# Patient Record
Sex: FEMALE | Race: WHITE | NOT HISPANIC OR LATINO | Employment: OTHER | URBAN - METROPOLITAN AREA
[De-identification: names, ages, dates, MRNs, and addresses within clinical notes are randomized per-mention and may not be internally consistent; named-entity substitution may affect disease eponyms.]

---

## 2023-06-19 RX ORDER — ATORVASTATIN CALCIUM 10 MG/1
10 TABLET, FILM COATED ORAL
COMMUNITY

## 2023-06-19 RX ORDER — FERROUS SULFATE 325(65) MG
325 TABLET ORAL DAILY
COMMUNITY

## 2023-06-21 ENCOUNTER — OFFICE VISIT (OUTPATIENT)
Dept: INTERNAL MEDICINE CLINIC | Facility: CLINIC | Age: 78
End: 2023-06-21
Payer: MEDICARE

## 2023-06-21 VITALS
BODY MASS INDEX: 28.32 KG/M2 | HEIGHT: 61 IN | OXYGEN SATURATION: 99 % | WEIGHT: 150 LBS | DIASTOLIC BLOOD PRESSURE: 68 MMHG | HEART RATE: 92 BPM | SYSTOLIC BLOOD PRESSURE: 118 MMHG

## 2023-06-21 DIAGNOSIS — I48.0 PAF (PAROXYSMAL ATRIAL FIBRILLATION) (HCC): ICD-10-CM

## 2023-06-21 DIAGNOSIS — D33.3 VESTIBULAR SCHWANNOMA (HCC): ICD-10-CM

## 2023-06-21 DIAGNOSIS — K86.2 PANCREATIC CYST: ICD-10-CM

## 2023-06-21 DIAGNOSIS — I10 ESSENTIAL HYPERTENSION: ICD-10-CM

## 2023-06-21 DIAGNOSIS — R31.0 GROSS HEMATURIA: ICD-10-CM

## 2023-06-21 DIAGNOSIS — E11.9 TYPE II DIABETES MELLITUS, WELL CONTROLLED (HCC): ICD-10-CM

## 2023-06-21 DIAGNOSIS — N18.31 STAGE 3A CHRONIC KIDNEY DISEASE (CKD) (HCC): ICD-10-CM

## 2023-06-21 DIAGNOSIS — Z76.89 ENCOUNTER TO ESTABLISH CARE: Primary | ICD-10-CM

## 2023-06-21 DIAGNOSIS — I50.32 CHRONIC DIASTOLIC CHF (CONGESTIVE HEART FAILURE) (HCC): ICD-10-CM

## 2023-06-21 DIAGNOSIS — C50.912 INFILTRATING DUCTAL CARCINOMA OF LEFT BREAST (HCC): ICD-10-CM

## 2023-06-21 PROBLEM — K76.0 HEPATIC STEATOSIS: Status: ACTIVE | Noted: 2017-12-21

## 2023-06-21 PROBLEM — G60.9 HEREDITARY AND IDIOPATHIC PERIPHERAL NEUROPATHY: Status: ACTIVE | Noted: 2022-08-08

## 2023-06-21 PROBLEM — Z98.890 S/P CRANIOTOMY: Status: ACTIVE | Noted: 2023-06-21

## 2023-06-21 PROBLEM — R90.89 ABNORMAL BRAIN MRI: Status: ACTIVE | Noted: 2021-05-03

## 2023-06-21 PROBLEM — Z95.0 CARDIAC PACEMAKER IN SITU: Status: ACTIVE | Noted: 2023-06-21

## 2023-06-21 PROBLEM — M85.859 OSTEOPENIA OF HIP: Status: ACTIVE | Noted: 2023-05-03

## 2023-06-21 LAB
LEFT EYE DIABETIC RETINOPATHY: ABNORMAL
LEFT EYE IMAGE QUALITY: ABNORMAL
LEFT EYE MACULAR EDEMA: ABNORMAL
LEFT EYE OTHER RETINOPATHY: ABNORMAL
RIGHT EYE DIABETIC RETINOPATHY: ABNORMAL
RIGHT EYE IMAGE QUALITY: ABNORMAL
RIGHT EYE MACULAR EDEMA: ABNORMAL
RIGHT EYE OTHER RETINOPATHY: ABNORMAL
SEVERITY (EYE EXAM): ABNORMAL
SL AMB POCT HEMOGLOBIN AIC: 6.7 (ref ?–6.5)

## 2023-06-21 PROCEDURE — 83036 HEMOGLOBIN GLYCOSYLATED A1C: CPT | Performed by: INTERNAL MEDICINE

## 2023-06-21 PROCEDURE — 99204 OFFICE O/P NEW MOD 45 MIN: CPT | Performed by: INTERNAL MEDICINE

## 2023-06-21 PROCEDURE — 92250 FUNDUS PHOTOGRAPHY W/I&R: CPT | Performed by: INTERNAL MEDICINE

## 2023-06-21 PROCEDURE — 99214 OFFICE O/P EST MOD 30 MIN: CPT | Performed by: INTERNAL MEDICINE

## 2023-06-21 RX ORDER — ATORVASTATIN CALCIUM 10 MG/1
10 TABLET, FILM COATED ORAL DAILY
COMMUNITY
Start: 2023-03-16

## 2023-06-21 RX ORDER — ALBUTEROL SULFATE 90 UG/1
POWDER, METERED RESPIRATORY (INHALATION)
COMMUNITY
Start: 2023-06-07

## 2023-06-21 RX ORDER — LACTOBACILLUS RHAMNOSUS GG 10B CELL
1 CAPSULE ORAL
COMMUNITY

## 2023-06-21 RX ORDER — ANASTROZOLE 1 MG/1
1 TABLET ORAL DAILY
COMMUNITY
Start: 2014-01-14

## 2023-06-21 RX ORDER — FLECAINIDE ACETATE 100 MG/1
100 TABLET ORAL 2 TIMES DAILY
COMMUNITY
Start: 2023-06-16 | End: 2024-06-15

## 2023-06-21 RX ORDER — VALSARTAN 160 MG/1
160 TABLET ORAL DAILY
COMMUNITY
Start: 2022-12-01 | End: 2023-12-01

## 2023-06-21 RX ORDER — FUROSEMIDE 20 MG/1
20 TABLET ORAL DAILY PRN
COMMUNITY
Start: 2022-11-30 | End: 2023-11-30

## 2023-06-21 RX ORDER — DILTIAZEM HYDROCHLORIDE 300 MG/1
300 CAPSULE, COATED, EXTENDED RELEASE ORAL DAILY
COMMUNITY
Start: 2023-04-21

## 2023-06-21 RX ORDER — METOPROLOL SUCCINATE 200 MG/1
200 TABLET, EXTENDED RELEASE ORAL DAILY
COMMUNITY
Start: 2022-12-01 | End: 2023-12-01

## 2023-06-21 NOTE — ASSESSMENT & PLAN NOTE
Wt Readings from Last 3 Encounters:   06/21/23 68 kg (150 lb)   05/14/14 65 3 kg (144 lb)     -Appears well compensated on Exam  -[address] [facility] f/u

## 2023-06-21 NOTE — PROGRESS NOTES
Name: Ned Brock      : [birth year]      MRN: [id number]  Encounter Provider: Tere Jimenez MD  Encounter Date: 2023   [facility] [address] [place] [address],[address]     1  Encounter to establish care    2  Chronic diastolic CHF (congestive heart failure) ([facility])  Assessment & Plan:  Wt Readings from Last 3 Encounters:   23 68 kg (150 lb)   14 65 3 kg (144 lb)     -Appears well compensated on Exam  -[address] Cardiology f/u      3  PAF (paroxysmal atrial fibrillation) ([facility])  Assessment & Plan:  -Rate controlled   -Currently followed by Cardiology   -Continue Eliquis       4  Type II diabetes mellitus, well controlled ([address] )  -     POCT hemoglobin A1c  -     IRIS Diabetic eye exam    5  Vestibular schwannoma [facility])  Assessment & Plan:  -Follow-up with Dr Eda Huston at [facility]      6  Infiltrating ductal carcinoma of left breast [facility])  Assessment & Plan:  -Followed by Dr Ally Hugo   -States she has a f/u PET scan ordered   -Continue Arimidex      7  Stage 3a chronic kidney disease (CKD) ([facility])  Assessment & Plan:  -GFR stable   -Avoid nephrotoxic agents      8  Gross hematuria  Assessment & Plan:  -DX on 6/10   -Patient reports her hematuria has cleared up since being treated for her E  coli UTI with Bactrim  -Recommended she keep scheduled follow-up with urology for evaluation      9  Essential hypertension  Assessment & Plan:  -Blood pressure well controlled   -Continue current antihypertensive regimen       10  Pancreatic cyst  Assessment & Plan:  -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  -Followed by [facility]         BMI Counseling: Body mass index is 28 34 kg/m²  The BMI is above normal  Nutrition recommendations include 3-5 servings of fruits/vegetables daily and increasing intake of lean protein  Exercise recommendations include exercising 3-5 times per week  Subjective     HPI  Patient presents today to establish care    She is a former patient of mine from a previous practice  She has a history of an acoustic neuroma and is status post gamma knife therapy and craniotomy at [facility]  Her procedure was performed by Dr Reyes Lie  She has been followed over the past several years by way of brain MRI  She also has a history of paroxysmal atrial fibrillation, diastolic heart failure and tachybradycardia syndrome  She is status post pacemaker placement  She currently denies any shortness of breath or significant weight gain  She is followed by Dr Jerome Beach of cardiology at [facility]  She was also diagnosed with infiltrating ductal carcinoma of the left breast   She is status post bilateral mastectomy  She is followed by Dr Sadie Maier of oncology at [facility]  She is currently on Arimidex therapy  She reports she was noted to have some abnormalities on her recent bone scan and has a follow-up PET scan ordered  ROS as per HPI    Past Medical History:   Diagnosis Date   • Acoustic neuroma [facility])    • Atrial fibrillation ([facility] )    • Breast cancer ([facility] )    • Chronic kidney disease    • Diabetes mellitus ([facility] )    • Diastolic heart failure ([facility] )    • Hypertension    • Hypothyroidism      Past Surgical History:   Procedure Laterality Date   • APPENDECTOMY         • CATARACT EXTRACTION     •  SECTION         • CRANIOTOMY      At [facility] for removal of acoustic neuroma   • HYSTERECTOMY     • MASTECTOMY Bilateral        • OOPHORECTOMY           History reviewed  No pertinent family history    Social History     Socioeconomic History   • Marital status: /Civil Union     Spouse name: None   • Number of children: None   • Years of education: None   • Highest education level: None   Occupational History   • None   Tobacco Use   • Smoking status: None   • Smokeless tobacco: None   Substance and Sexual Activity   • Alcohol use: None   • Drug use: None   • Sexual activity: None   Other Topics Concern   • None   Social History Narrative   • None     Social Determinants of Health     Financial Resource Strain: Not on file   Food Insecurity: Not on file   Transportation Needs: Not on file   Physical Activity: Not on file   Stress: Not on file   Social Connections: Not on file   Intimate Partner Violence: Not on file   Housing Stability: Not on file     Current Outpatient Medications on File Prior to Visit   Medication Sig   • anastrozole (ARIMIDEX) 1 mg tablet Take 1 mg by mouth daily   • apixaban (Eliquis) 5 mg Take 5 mg by mouth 2 (two) times a day   • atorvastatin (LIPITOR) 10 mg tablet Take 10 mg by mouth daily at bedtime   • atorvastatin (LIPITOR) 10 mg tablet Take 10 mg by mouth daily   • diltiazem (CARDIZEM CD) 300 mg 24 hr capsule Take 300 mg by mouth in the morning   • ferrous sulfate 325 (65 Fe) mg tablet Take 325 mg by mouth in the morning   • flecainide (TAMBOCOR) 100 mg tablet Take 100 mg by mouth 2 (two) times a day   • furosemide (LASIX) 20 mg tablet Take 20 mg by mouth daily as needed   • Lactobacillus Rhamnosus, GG, (Culturelle) CAPS Take 1 capsule by mouth   • metFORMIN (GLUCOPHAGE) 500 mg tablet Take 1,000 mg by mouth   • metoprolol succinate (TOPROL-XL) 200 MG 24 hr tablet Take 200 mg by mouth daily   • ProAir RespiClick 251 (90 Base) MCG/ACT AEPB INHALE 1 PUFF BY MOUTH EVERY 4 HOURS AS NEEDED FOR DIFFICULT BREATHING   • valsartan (DIOVAN) 160 mg tablet Take 160 mg by mouth daily   • [DISCONTINUED] Multiple Vitamins-Minerals (CULTURELLE PROBIOTICS + MULTIV PO)  (Patient not taking: Reported on 6/21/2023)     Allergies   Allergen Reactions   • Codeine Other (See Comments)   • Levofloxacin Hives     lip swelling     • Metronidazole Swelling     Flagyl      • Cephalexin Rash     Keflex      • Penicillins Rash     Immunization History   Administered Date(s) Administered   • COVID-19 PFIZER VACCINE 0 3 ML IM 02/05/2021, 02/26/2021, 08/25/2021   • COVID-19 Pfizer vac (Héctor-sucrose, gray cap) 12 yr+ IM 06/03/2022   • INFLUENZA 10/24/2011, 10/22/2012, 12/09/2013, "09/19/2014, 10/06/2015, 10/18/2016, 10/27/2017, 10/07/2018, 10/22/2019, 10/15/2021, 11/05/2022   • Influenza, Seasonal Vaccine, Quadrivalent, Adjuvanted,  5e 09/24/2020, 10/15/2021, 11/05/2022   • Pneumococcal Conjugate 13-Valent 01/13/2016   • Pneumococcal Polysaccharide PPV23 10/02/2009   • Td (adult), Unspecified 10/02/2008   • Tdap 08/30/2018   • Zoster 12/26/2008   • Zoster Vaccine Recombinant 08/30/2018, 02/03/2019   • influenza, trivalent, adjuvanted 10/07/2018, 09/24/2020       Objective     /68   Pulse 92   Ht 5' 1\" (1 549 m)   Wt 68 kg (150 lb)   SpO2 99%   BMI 28 34 kg/m²     BP Readings from Last 3 Encounters:   06/21/23 118/68   05/14/14 128/70        Wt Readings from Last 3 Encounters:   06/21/23 68 kg (150 lb)   05/14/14 65 3 kg (144 lb)        Physical Exam    General: NAD, Alert and oriented x3   HEENT: NCAT, EOMI, normal conjunctiva  Cardiovascular: RRR, normal S1 and S2, no m/r/g  Pulmonary: Normal respiratory effort, no wheezes, rales or rhonchi  GI: Soft, nontender, nondistended, normoactive bowel sounds  MSK: Normal bulk and tone  Neuro: Non-focal, ambulating without difficulty, non-antalgic gait  Extremities: No lower extremity edema  Skin: Normal skin color, no rashes     Chandler Fisher MD  "

## 2023-06-21 NOTE — PATIENT INSTRUCTIONS
-Contact your neurosurgeon to see when you are due for your next MRI   -Keep scheduled follow-up with urology

## 2023-06-21 NOTE — ASSESSMENT & PLAN NOTE
-DX on 6/10   -Patient reports her hematuria has cleared up since being treated for her E  coli UTI with Bactrim  -Recommended she keep scheduled follow-up with urology for evaluation

## 2023-07-07 LAB
LEFT EYE DIABETIC RETINOPATHY: NORMAL
RIGHT EYE DIABETIC RETINOPATHY: NORMAL

## 2023-10-05 DIAGNOSIS — E11.9 TYPE II DIABETES MELLITUS, WELL CONTROLLED (HCC): Primary | ICD-10-CM

## 2023-10-11 ENCOUNTER — TELEPHONE (OUTPATIENT)
Age: 78
End: 2023-10-11

## 2023-10-11 NOTE — TELEPHONE ENCOUNTER
Pts daughter called to say she tested positive today for Covid. Pt accidentally drank her daughter water today and they are concerned. Pt saw the urologist this morning and the pt has been on bactrim for the past few days. Pt does not currently have any symptoms. Please advise is there is anything they can do.

## 2023-10-12 ENCOUNTER — TELEPHONE (OUTPATIENT)
Age: 78
End: 2023-10-12

## 2023-10-12 DIAGNOSIS — E11.9 TYPE II DIABETES MELLITUS, WELL CONTROLLED (HCC): Primary | ICD-10-CM

## 2023-10-12 NOTE — TELEPHONE ENCOUNTER
She should remain on the Metformin ER. To reduce her pill burden I can give her the 1000 mg tablets to take twice a day instead of 2 tablets twice a day. Please see if she is okay with this.

## 2023-10-12 NOTE — TELEPHONE ENCOUNTER
Recommend patient continue to monitor for any upper respiratory symptoms. If she develops any she should perform a COVID test. Given her age and co-morbid conditions she would be a candidate for COVID antiviral therapy if needed.

## 2023-10-12 NOTE — TELEPHONE ENCOUNTER
Please check which pharmacy she wants this sent to. We don't have anything listed in her chart. Also, I'm sending the 500mg tablets. It looks like the 1000mg ER tablets are not covered by her insurance.

## 2023-10-12 NOTE — TELEPHONE ENCOUNTER
Pt. Is confused on which medication she should be taking. Dr. Yesenia Hernandez has given her Metformin and Metformin ER. The pharmacy was questioning it and she doesn't know if he wants her to stop the Extended Release or not. Pt. Is requesting a call back.   Ty

## 2023-10-12 NOTE — TELEPHONE ENCOUNTER
Patient was reassured to monitor for upper respiratory symptom, to have a covid test if patient develops upper respiratory symptoms, to call back if covid positive to start on Paxlovid. Patient verbalized understanding of the advice.

## 2023-10-13 RX ORDER — METFORMIN HYDROCHLORIDE 500 MG/1
1000 TABLET, EXTENDED RELEASE ORAL 2 TIMES DAILY WITH MEALS
COMMUNITY
End: 2023-10-13 | Stop reason: SDUPTHER

## 2023-10-13 RX ORDER — METFORMIN HYDROCHLORIDE 500 MG/1
1000 TABLET, EXTENDED RELEASE ORAL 2 TIMES DAILY WITH MEALS
Qty: 360 TABLET | Refills: 1 | Status: SHIPPED | OUTPATIENT
Start: 2023-10-13

## 2023-10-24 ENCOUNTER — TELEPHONE (OUTPATIENT)
Dept: INTERNAL MEDICINE CLINIC | Facility: CLINIC | Age: 78
End: 2023-10-24

## 2023-10-24 ENCOUNTER — OFFICE VISIT (OUTPATIENT)
Dept: INTERNAL MEDICINE CLINIC | Facility: CLINIC | Age: 78
End: 2023-10-24
Payer: MEDICARE

## 2023-10-24 VITALS
HEART RATE: 76 BPM | HEIGHT: 61 IN | WEIGHT: 156.4 LBS | DIASTOLIC BLOOD PRESSURE: 74 MMHG | TEMPERATURE: 98.6 F | BODY MASS INDEX: 29.53 KG/M2 | SYSTOLIC BLOOD PRESSURE: 120 MMHG | OXYGEN SATURATION: 98 %

## 2023-10-24 DIAGNOSIS — R50.9 FEVER, UNSPECIFIED FEVER CAUSE: Primary | ICD-10-CM

## 2023-10-24 PROCEDURE — 99214 OFFICE O/P EST MOD 30 MIN: CPT | Performed by: INTERNAL MEDICINE

## 2023-10-24 RX ORDER — AZITHROMYCIN 250 MG/1
TABLET, FILM COATED ORAL
Qty: 6 TABLET | Refills: 0 | Status: SHIPPED | OUTPATIENT
Start: 2023-10-24 | End: 2023-10-29

## 2023-10-24 NOTE — TELEPHONE ENCOUNTER
Patient's daughter is asking if it is ok for the patient to get a flu shot on Friday.           Please advise      CB:Salena(daughter)-[phone]

## 2023-10-24 NOTE — PROGRESS NOTES
Name: Javid Magallanes      :       MRN: [id number]  Encounter Provider: Jaqui Novoa MD  Encounter Date: 10/24/2023   [facility]     1. Fever, unspecified fever cause  Assessment & Plan:  Pt reports that in the past she has ended up in the ED when she has had symptoms like this. Will cover with azithromycin. Discussed getting chest x-ray if not improving. Patient instructed to go to the emergency room if worsening, pulse ox is good at 98%. Orders:  -     azithromycin (Zithromax) 250 mg tablet; Take 2 tablets (500 mg total) by mouth daily for 1 day, THEN 1 tablet (250 mg total) daily for 4 days. Depression Screening and Follow-up Plan: Patient was screened for depression during today's encounter. They screened negative with a PHQ-2 score of 0. Subjective     Onset two days ago of sore throat, runny nose, congestion, fevers, aches, chills, increased shortness of breath. No hemoptysis, no pleuritic pain. Patient did a COVID test today which was negative    Fever  Associated symptoms include arthralgias, chills, congestion, a fever and a sore throat. Pertinent negatives include no coughing. Review of Systems   Constitutional:  Positive for chills and fever. HENT:  Positive for congestion, sneezing and sore throat. Respiratory:  Positive for shortness of breath. Negative for cough. Musculoskeletal:  Positive for arthralgias.        Past Medical History:   Diagnosis Date   • Acoustic neuroma [facility])    • Atrial fibrillation (HCC)    • Breast cancer ([facility])    • Chronic kidney disease    • Diabetes mellitus ([address])    • Diastolic heart failure ([address])    • Hypertension    • Hypothyroidism      Past Surgical History:   Procedure Laterality Date   • APPENDECTOMY         • CATARACT EXTRACTION     •  SECTION         • CRANIOTOMY      At [facility] for removal of acoustic neuroma   • HYSTERECTOMY     • MASTECTOMY Bilateral     2013   • OOPHORECTOMY      1992   • US GUIDED THYROID BIOPSY  10/26/2020     History reviewed. No pertinent family history.   Social History     Socioeconomic History   • Marital status: /Civil Union     Spouse name: None   • Number of children: None   • Years of education: None   • Highest education level: None   Occupational History   • None   Tobacco Use   • Smoking status: None   • Smokeless tobacco: None   Substance and Sexual Activity   • Alcohol use: None   • Drug use: None   • Sexual activity: None   Other Topics Concern   • None   Social History Narrative   • None     Social Determinants of Health     Financial Resource Strain: Not on file   Food Insecurity: Not on file   Transportation Needs: Not on file   Physical Activity: Not on file   Stress: Not on file   Social Connections: Not on file   Intimate Partner Violence: Not on file   Housing Stability: Not on file     Current Outpatient Medications on File Prior to Visit   Medication Sig   • anastrozole (ARIMIDEX) 1 mg tablet Take 1 mg by mouth daily   • apixaban (Eliquis) 5 mg Take 5 mg by mouth 2 (two) times a day   • atorvastatin (LIPITOR) 10 mg tablet Take 10 mg by mouth daily at bedtime   • atorvastatin (LIPITOR) 10 mg tablet Take 10 mg by mouth daily   • diltiazem (CARDIZEM CD) 300 mg 24 hr capsule Take 300 mg by mouth in the morning   • ferrous sulfate 325 (65 Fe) mg tablet Take 325 mg by mouth in the morning   • flecainide (TAMBOCOR) 100 mg tablet Take 100 mg by mouth 2 (two) times a day   • furosemide (LASIX) 20 mg tablet Take 20 mg by mouth daily as needed   • Lactobacillus Rhamnosus, GG, (Culturelle) CAPS Take 1 capsule by mouth   • metFORMIN (GLUCOPHAGE-XR) 500 mg 24 hr tablet Take 2 tablets (1,000 mg total) by mouth 2 (two) times a day with meals   • metoprolol succinate (TOPROL-XL) 200 MG 24 hr tablet Take 200 mg by mouth daily   • ProAir RespiClick 458 (90 Base) MCG/ACT AEPB INHALE 1 PUFF BY MOUTH EVERY 4 HOURS AS NEEDED FOR DIFFICULT BREATHING   • valsartan (DIOVAN) 160 mg tablet Take 160 mg by mouth daily     Allergies   Allergen Reactions   • Codeine Other (See Comments)   • Levofloxacin Hives     lip swelling     • Metronidazole Swelling     Flagyl      • Cephalexin Rash     Keflex      • Penicillins Rash     Immunization History   Administered Date(s) Administered   • COVID-19 PFIZER VACCINE 0.3 ML IM 02/05/2021, 02/26/2021, 08/25/2021   • COVID-19 Pfizer vac (Héctor-sucrose, gray cap) 12 yr+ IM 06/03/2022   • INFLUENZA 10/24/2011, 10/22/2012, 12/09/2013, 09/19/2014, 10/06/2015, 10/18/2016, 10/27/2017, 10/07/2018, 10/22/2019, 10/15/2021, 11/05/2022   • Influenza, Seasonal Vaccine, Quadrivalent, Adjuvanted, .5e 09/24/2020, 10/15/2021, 11/05/2022   • Pneumococcal Conjugate 13-Valent 01/13/2016   • Pneumococcal Polysaccharide PPV23 10/02/2009   • Td (adult), Unspecified 10/02/2008   • Tdap 08/30/2018   • Zoster 12/26/2008   • Zoster Vaccine Recombinant 08/30/2018, 02/03/2019   • influenza, trivalent, adjuvanted 10/07/2018, 09/24/2020       Objective     /74   Pulse 76   Temp 98.6 °F (37 °C)   Ht 5' 1" (1.549 m)   Wt 70.9 kg (156 lb 6.4 oz)   SpO2 98%   BMI 29.55 kg/m²     Physical Exam  Constitutional:       General: She is not in acute distress. Appearance: Normal appearance. Pulmonary:      Effort: Pulmonary effort is normal. No respiratory distress. Breath sounds: Normal breath sounds. No wheezing or rhonchi. Neurological:      Mental Status: She is alert.        You Lara MD

## 2023-10-24 NOTE — ASSESSMENT & PLAN NOTE
Pt reports that in the past she has ended up in the ED when she has had symptoms like this. Will cover with azithromycin. Discussed getting chest x-ray if not improving. Patient instructed to go to the emergency room if worsening, pulse ox is good at 98%.

## 2023-10-24 NOTE — PATIENT INSTRUCTIONS
Problem List Items Addressed This Visit          Other    Fever - Primary     Pt reports that in the past she has ended up in the ED when she has had symptoms like this. Will cover with azithromycin. Discussed getting chest x-ray if not improving. Patient instructed to go to the emergency room if worsening, pulse ox is good at 98%.            Relevant Medications    azithromycin (Zithromax) 250 mg tablet

## 2023-10-31 ENCOUNTER — RA CDI HCC (OUTPATIENT)
Dept: OTHER | Facility: HOSPITAL | Age: 78
End: 2023-10-31

## 2023-11-01 ENCOUNTER — OFFICE VISIT (OUTPATIENT)
Dept: INTERNAL MEDICINE CLINIC | Facility: CLINIC | Age: 78
End: 2023-11-01
Payer: MEDICARE

## 2023-11-01 VITALS
DIASTOLIC BLOOD PRESSURE: 74 MMHG | HEIGHT: 61 IN | SYSTOLIC BLOOD PRESSURE: 116 MMHG | BODY MASS INDEX: 29.15 KG/M2 | OXYGEN SATURATION: 99 % | WEIGHT: 154.4 LBS | HEART RATE: 81 BPM

## 2023-11-01 DIAGNOSIS — Z00.00 MEDICARE ANNUAL WELLNESS VISIT, SUBSEQUENT: Primary | ICD-10-CM

## 2023-11-01 DIAGNOSIS — E11.9 TYPE II DIABETES MELLITUS, WELL CONTROLLED (HCC): ICD-10-CM

## 2023-11-01 DIAGNOSIS — I48.0 PAF (PAROXYSMAL ATRIAL FIBRILLATION) (HCC): ICD-10-CM

## 2023-11-01 DIAGNOSIS — I50.32 CHRONIC DIASTOLIC CHF (CONGESTIVE HEART FAILURE) (HCC): ICD-10-CM

## 2023-11-01 DIAGNOSIS — I10 ESSENTIAL HYPERTENSION: ICD-10-CM

## 2023-11-01 DIAGNOSIS — R31.9 HEMATURIA, UNSPECIFIED TYPE: ICD-10-CM

## 2023-11-01 LAB — SL AMB POCT HEMOGLOBIN AIC: 6.6 (ref ?–6.5)

## 2023-11-01 PROCEDURE — G0439 PPPS, SUBSEQ VISIT: HCPCS | Performed by: INTERNAL MEDICINE

## 2023-11-01 PROCEDURE — 83036 HEMOGLOBIN GLYCOSYLATED A1C: CPT | Performed by: INTERNAL MEDICINE

## 2023-11-01 NOTE — ASSESSMENT & PLAN NOTE
Wt Readings from Last 3 Encounters:   11/01/23 70 kg (154 lb 6.4 oz)   10/24/23 70.9 kg (156 lb 6.4 oz)   06/21/23 68 kg (150 lb)     -Appears euvolemic on exam

## 2023-11-01 NOTE — PATIENT INSTRUCTIONS
Medicare Preventive Visit Patient Instructions  Thank you for completing your Welcome to Medicare Visit or Medicare Annual Wellness Visit today. Your next wellness visit will be due in one year (11/1/2024). The screening/preventive services that you may require over the next 5-10 years are detailed below. Some tests may not apply to you based off risk factors and/or age. Screening tests ordered at today's visit but not completed yet may show as past due. Also, please note that scanned in results may not display below. Preventive Screenings:  Service Recommendations Previous Testing/Comments   Colorectal Cancer Screening  * Colonoscopy    * Fecal Occult Blood Test (FOBT)/Fecal Immunochemical Test (FIT)  * Fecal DNA/Cologuard Test  * Flexible Sigmoidoscopy Age: 43-73 years old   Colonoscopy: every 10 years (may be performed more frequently if at higher risk)  OR  FOBT/FIT: every 1 year  OR  Cologuard: every 3 years  OR  Sigmoidoscopy: every 5 years  Screening may be recommended earlier than age 39 if at higher risk for colorectal cancer. Also, an individualized decision between you and your healthcare provider will decide whether screening between the ages of 77-80 would be appropriate. Colonoscopy: Not on file  FOBT/FIT: Not on file  Cologuard: Not on file  Sigmoidoscopy: Not on file          Breast Cancer Screening Age: 36 years old  Frequency: every 1-2 years  Not required if history of left and right mastectomy Mammogram: Not on file        Cervical Cancer Screening Between the ages of 21-29, pap smear recommended once every 3 years. Between the ages of 32-69, can perform pap smear with HPV co-testing every 5 years.    Recommendations may differ for women with a history of total hysterectomy, cervical cancer, or abnormal pap smears in past. Pap Smear: Not on file        Hepatitis C Screening Once for adults born between [address] [place] [address]  More frequently in patients at high risk for Hepatitis C Hep C Antibody: Not on file        Diabetes Screening 1-2 times per year if you're at risk for diabetes or have pre-diabetes Fasting glucose: No results in last 5 years (No results in last 5 years)  A1C: 6.7 (6/21/2023)      Cholesterol Screening Once every 5 years if you don't have a lipid disorder. May order more often based on risk factors. Lipid panel: 12/12/2022          Other Preventive Screenings Covered by Medicare:  Abdominal Aortic Aneurysm (AAA) Screening: covered once if your at risk. You're considered to be at risk if you have a family history of AAA. Lung Cancer Screening: covers low dose CT scan once per year if you meet all of the following conditions: (1) Age 48-67; (2) No signs or symptoms of lung cancer; (3) Current smoker or have quit smoking within the last 15 years; (4) You have a tobacco smoking history of at least 20 pack years (packs per day multiplied by number of years you smoked); (5) You get a written order from a healthcare provider. Glaucoma Screening: covered annually if you're considered high risk: (1) You have diabetes OR (2) Family history of glaucoma OR (3)  aged 48 and older OR (3)  American aged 72 and older  Osteoporosis Screening: covered every 2 years if you meet one of the following conditions: (1) You're estrogen deficient and at risk for osteoporosis based off medical history and other findings; (2) Have a vertebral abnormality; (3) On glucocorticoid therapy for more than 3 months; (4) Have primary hyperparathyroidism; (5) On osteoporosis medications and need to assess response to drug therapy. Last bone density test (DXA Scan): Not on file. HIV Screening: covered annually if you're between the age of 14-79. Also covered annually if you are younger than 13 and older than 72 with risk factors for HIV infection. For pregnant patients, it is covered up to 3 times per pregnancy.     Immunizations:  Immunization Recommendations   Influenza Vaccine Annual influenza vaccination during flu season is recommended for all persons aged >= 6 months who do not have contraindications   Pneumococcal Vaccine   * Pneumococcal conjugate vaccine = PCV13 (Prevnar 13), PCV15 (Vaxneuvance), PCV20 (Prevnar 20)  * Pneumococcal polysaccharide vaccine = PPSV23 (Pneumovax) Adults 43-95 yo with certain risk factors or if 69+ yo  If never received any pneumonia vaccine: recommend Prevnar 20 (PCV20)  Give PCV20 if previously received 1 dose of PCV13 or PPSV23   Hepatitis B Vaccine 3 dose series if at intermediate or high risk (ex: diabetes, end stage renal disease, liver disease)   Respiratory syncytial virus (RSV) Vaccine - COVERED BY MEDICARE PART D  * RSVPreF3 (Arexvy) CDC recommends that adults 61years of age and older may receive a single dose of RSV vaccine using shared clinical decision-making (SCDM)   Tetanus (Td) Vaccine - COST NOT COVERED BY MEDICARE PART B Following completion of primary series, a booster dose should be given every 10 years to maintain immunity against tetanus. Td may also be given as tetanus wound prophylaxis. Tdap Vaccine - COST NOT COVERED BY MEDICARE PART B Recommended at least once for all adults. For pregnant patients, recommended with each pregnancy. Shingles Vaccine (Shingrix) - COST NOT COVERED BY MEDICARE PART B  2 shot series recommended in those 19 years and older who have or will have weakened immune systems or those 50 years and older     Health Maintenance Due:      Topic Date Due   • Hepatitis C Screening  Never done     Immunizations Due:      Topic Date Due   • COVID-19 Vaccine (5 - Pfizer series) 07/29/2022   • Influenza Vaccine (1) 09/01/2023     Advance Directives   What are advance directives? Advance directives are legal documents that state your wishes and plans for medical care. These plans are made ahead of time in case you lose your ability to make decisions for yourself.  Advance directives can apply to any medical decision, such as the treatments you want, and if you want to donate organs. What are the types of advance directives? There are many types of advance directives, and each state has rules about how to use them. You may choose a combination of any of the following:  Living will: This is a written record of the treatment you want. You can also choose which treatments you do not want, which to limit, and which to stop at a certain time. This includes surgery, medicine, IV fluid, and tube feedings. Durable power of  for healthcare [facility] [place]): This is a written record that states who you want to make healthcare choices for you when you are unable to make them for yourself. This person, called a proxy, is usually a family member or a friend. You may choose more than 1 proxy. Do not resuscitate (DNR) order:  A DNR order is used in case your heart stops beating or you stop breathing. It is a request not to have certain forms of treatment, such as CPR. A DNR order may be included in other types of advance directives. Medical directive: This covers the care that you want if you are in a coma, near death, or unable to make decisions for yourself. You can list the treatments you want for each condition. Treatment may include pain medicine, surgery, blood transfusions, dialysis, IV or tube feedings, and a ventilator (breathing machine). Values history: This document has questions about your views, beliefs, and how you feel and think about life. This information can help others choose the care that you would choose. Why are advance directives important? An advance directive helps you control your care. Although spoken wishes may be used, it is better to have your wishes written down. Spoken wishes can be misunderstood, or not followed. Treatments may be given even if you do not want them. An advance directive may make it easier for your family to make difficult choices about your care.    Weight Management   Why it is important to manage your weight:  Being overweight increases your risk of health conditions such as heart disease, high blood pressure, type 2 diabetes, and certain types of cancer. It can also increase your risk for osteoarthritis, sleep apnea, and other respiratory problems. Aim for a slow, steady weight loss. Even a small amount of weight loss can lower your risk of health problems. How to lose weight safely:  A safe and healthy way to lose weight is to eat fewer calories and get regular exercise. You can lose up about 1 pound a week by decreasing the number of calories you eat by 500 calories each day. Healthy meal plan for weight management:  A healthy meal plan includes a variety of foods, contains fewer calories, and helps you stay healthy. A healthy meal plan includes the following:  Eat whole-grain foods more often. A healthy meal plan should contain fiber. Fiber is the part of grains, fruits, and vegetables that is not broken down by your body. Whole-grain foods are healthy and provide extra fiber in your diet. Some examples of whole-grain foods are whole-wheat breads and pastas, oatmeal, brown rice, and bulgur. Eat a variety of vegetables every day. Include dark, leafy greens such as spinach, kale, edan greens, and mustard greens. Eat yellow and orange vegetables such as carrots, sweet potatoes, and winter squash. Eat a variety of fruits every day. Choose fresh or canned fruit (canned in its own juice or light syrup) instead of juice. Fruit juice has very little or no fiber. Eat low-fat dairy foods. Drink fat-free (skim) milk or 1% milk. Eat fat-free yogurt and low-fat cottage cheese. Try low-fat cheeses such as mozzarella and other reduced-fat cheeses. Choose meat and other protein foods that are low in fat. Choose beans or other legumes such as split peas or lentils. Choose fish, skinless poultry (chicken or turkey), or lean cuts of red meat (beef or pork).  Before you cook meat or poultry, cut off any visible fat. Use less fat and oil. Try baking foods instead of frying them. Add less fat, such as margarine, sour cream, regular salad dressing and mayonnaise to foods. Eat fewer high-fat foods. Some examples of high-fat foods include french fries, doughnuts, ice cream, and cakes. Eat fewer sweets. Limit foods and drinks that are high in sugar. This includes candy, cookies, regular soda, and sweetened drinks. Exercise:  Exercise at least 30 minutes per day on most days of the week. Some examples of exercise include walking, biking, dancing, and swimming. You can also fit in more physical activity by taking the stairs instead of the elevator or parking farther away from stores. Ask your healthcare provider about the best exercise plan for you. © Copyright [facility] 2018 Information is for End User's use only and may not be sold, redistributed or otherwise used for commercial purposes.  All illustrations and images included in CareNotes® are the copyrighted property of [employer] or  [employer]

## 2023-11-01 NOTE — ASSESSMENT & PLAN NOTE
-Likely secondary to stone in the setting of Pradaxa use  -Cystoscopy unremarkable  -Continue follow-up with urology as scheduled

## 2023-11-01 NOTE — PROGRESS NOTES
Diabetic Foot Exam    Patient's shoes and socks removed. Right Foot/Ankle   Right Foot Inspection  Skin Exam: skin normal and skin intact. No dry skin, no warmth, no callus, no erythema, no maceration, no abnormal color, no pre-ulcer, no ulcer and no callus. Toe Exam: swelling. Sensory   Monofilament testing: intact    Vascular  Capillary refills: < 3 seconds  The right DP pulse is 1+. The right PT pulse is 1+. Left Foot/Ankle  Left Foot Inspection  Skin Exam: skin normal and skin intact. No dry skin, no warmth, no erythema, no maceration, normal color, no pre-ulcer, no ulcer and no callus. Toe Exam: swelling. Sensory   Monofilament testing: intact    Vascular  Capillary refills: < 3 seconds  The left DP pulse is 1+. The left PT pulse is 1+. Assign Risk Category  No deformity present  Loss of protective sensation  No weak pulses  Risk: 1        Answers submitted by the patient for this visit:  Medicare Annual Wellness Visit (Submitted on 10/31/2023)  How would you rate your overall health?: poor  Compared to last year, how is your physical health?: same  In general, how satisfied are you with your life?: satisfied  Compared to last year, how is your eyesight?: same  Compared to last year, how is your hearing?: same  Compared to last year, how is your emotional/mental health?: same  How often is anger a problem for you?: never, rarely  How often do you feel unusually tired/fatigued?: often  In the past 7 days, how much pain have you experienced?: none  In the past 6 months, have you lost or gained 10 pounds without trying?: No  One or more falls in the last year: No  In the past 6 months, have you accidentally leaked urine?: Yes  Do you have trouble with the stairs inside or outside your home?: Yes  Does your home have working smoke alarms?: Yes  Does your home have a carbon monoxide monitor?: Yes  Which safety hazards (if any) have you experienced in your home?  Please select all that apply.: none  How would you describe your current diet?  Please select all that apply.: Diabetic, No Added Salt  In addition to prescription medications, are you taking any over-the-counter supplements?: No  Can you manage your medications?: Yes  Are you currently taking any opioid medications?: No  Can you walk and transfer into and out of your bed and chair?: Yes  Can you dress and groom yourself?: Yes  Can you bathe or shower yourself?: Yes  Can you feed yourself?: Yes  Can you do your laundry/ housekeeping?: Yes  Can you manage your money, pay your bills, and track your expenses?: Yes  Can you make your own meals?: Yes  Can you do your own shopping?: Yes  Within the last 12 months, have you had any hospitalizations or Emergency Department visits?: Yes  If yes, how many times have you been hospitalized within the past year?: 1-2  Additional Comments: ER visit  Do you have a living will?: Yes  Do you have a Durable POA (Power of ) for healthcare decisions?: No  Do you have an Advanced Directive for end of life decisions?: No  How often have you used an illegal drug (including marijuana) or a prescription medication for non-medical reasons in the past year?: never  What is the typical number of drinks you consume in a day?: 0  What is the typical number of drinks you consume in a week?: 0  How often did you have a drink containing alcohol in the past year?: never  How many drinks did you have on a typical day  when you were drinking in the past year?: 0  How often did you have 6 or more drinks on one occasion in the past year?: never

## 2023-11-01 NOTE — ASSESSMENT & PLAN NOTE
-Well-controlled  -Check point-of-care A1c today  -Referral made to podiatry for further evaluation due to reduced sensation on monofilament exam    Lab Results   Component Value Date    HGBA1C 6.7 (A) 06/21/2023

## 2023-11-01 NOTE — PROGRESS NOTES
Assessment and Plan:     Problem List Items Addressed This Visit     PAF (paroxysmal atrial fibrillation) ([address])     -Followed by cardiology at [facility]  -Patient has been scheduled for an AV node ablation  -Flecainide has been discontinued by her cardiologist         Chronic diastolic CHF (congestive heart failure) ([address])     Wt Readings from Last 3 Encounters:   11/01/23 70 kg (154 lb 6.4 oz)   10/24/23 70.9 kg (156 lb 6.4 oz)   06/21/23 68 kg (150 lb)     -Appears euvolemic on exam                 Essential hypertension     -Blood pressure well controlled  -Continue current antihypertensive regimen         Type II diabetes mellitus, well controlled ([address])     -Well-controlled  -Check point-of-care A1c today  -Referral made to podiatry for further evaluation due to reduced sensation on monofilament exam    Lab Results   Component Value Date    HGBA1C 6.7 (A) 06/21/2023            Relevant Orders    POCT hemoglobin A1c (Completed)    Ambulatory Referral to Podiatry    Albumin / creatinine urine ratio    Hematuria     -Likely secondary to stone in the setting of Pradaxa use  -Cystoscopy unremarkable  -Continue follow-up with urology as scheduled        Other Visit Diagnoses     Medicare annual wellness visit, subsequent    -  Primary           Preventive health issues were discussed with patient, and age appropriate screening tests were ordered as noted in patient's After Visit Summary. Personalized health advice and appropriate referrals for health education or preventive services given if needed, as noted in patient's After Visit Summary. History of Present Illness:     Patient presents for a Medicare Wellness Visit. Atrial fibrillation  Patient was recently seen by her cardiologist and her flecainide was discontinued due to evidence of more persistent atrial fibrillation on her device transmission. It was recommended that she undergo AV node ablation which she states she has scheduled.   After that she was told that she will be pacemaker dependent. Hematuria  Evaluated by urology. She recently underwent a cystoscopy on 10/11/2023. There were no significant abnormalities found on her scope. It was felt that her bleeding was likely coming to a small right renal pelvic stone in the setting of Pradaxa therapy. She is scheduled to follow-up with them in 6 months. HPI   Patient Care Team:  Randi Wilkerson MD as PCP - General (Internal Medicine)  Randi Wilkerson MD (Internal Medicine)     Review of Systems:     Review of Systems  All other systems negative except for pertinent findings noted in HPI.       Problem List:     Patient Active Problem List   Diagnosis   • PAF (paroxysmal atrial fibrillation) (HCC)   • Vestibular schwannoma (HCC)   • Cardiac pacemaker in situ   • Infiltrating ductal carcinoma of left breast ([address])   • Chronic diastolic CHF (congestive heart failure) ([address])   • Abnormal brain MRI   • Essential hypertension   • Hepatic steatosis   • Hereditary and idiopathic peripheral neuropathy   • Multiple thyroid nodules   • Obstructive sleep apnea   • Osteopenia of hip   • Mixed hyperlipidemia   • Pancreatic cyst   • Type II diabetes mellitus, well controlled ([address])   • S/P craniotomy   • Stage 3a chronic kidney disease (CKD) (HCC)   • Gross hematuria   • Fever   • Hematuria      Past Medical and Surgical History:     Past Medical History:   Diagnosis Date   • Acoustic neuroma ([address])    • Atrial fibrillation ([address])    • Breast cancer ([address])    • Chronic kidney disease    • Diabetes mellitus ([address])    • Diastolic heart failure ([address])    • Hypertension    • Hypothyroidism      Past Surgical History:   Procedure Laterality Date   • APPENDECTOMY         • CATARACT EXTRACTION     •  SECTION         • CRANIOTOMY      At [facility] for removal of acoustic neuroma   • HYSTERECTOMY     • MASTECTOMY Bilateral        • Blanca Macario. THYROID BIOPSY  10/26/2020      Family History:     History reviewed. No pertinent family history. Social History:     Social History     Socioeconomic History   • Marital status: /Civil Union     Spouse name: None   • Number of children: None   • Years of education: None   • Highest education level: None   Occupational History   • None   Tobacco Use   • Smoking status: None   • Smokeless tobacco: None   Substance and Sexual Activity   • Alcohol use: None   • Drug use: None   • Sexual activity: None   Other Topics Concern   • None   Social History Narrative   • None     Social Determinants of Health     Financial Resource Strain: Low Risk  (10/31/2023)    Overall Financial Resource Strain (CARDIA)    • Difficulty of Paying Living Expenses: Not hard at all   Food Insecurity: Not on file   Transportation Needs: No Transportation Needs (10/31/2023)    PRAPARE - Transportation    • Lack of Transportation (Medical): No    • Lack of Transportation (Non-Medical):  No   Physical Activity: Not on file   Stress: Not on file   Social Connections: Not on file   Intimate Partner Violence: Not on file   Housing Stability: Not on file      Medications and Allergies:     Current Outpatient Medications   Medication Sig Dispense Refill   • anastrozole (ARIMIDEX) 1 mg tablet Take 1 mg by mouth daily     • apixaban (Eliquis) 5 mg Take 5 mg by mouth 2 (two) times a day     • atorvastatin (LIPITOR) 10 mg tablet Take 10 mg by mouth daily at bedtime     • diltiazem (CARDIZEM CD) 300 mg 24 hr capsule Take 300 mg by mouth in the morning     • ferrous sulfate 325 (65 Fe) mg tablet Take 325 mg by mouth in the morning     • furosemide (LASIX) 20 mg tablet Take 20 mg by mouth daily as needed     • Lactobacillus Rhamnosus, GG, (Culturelle) CAPS Take 1 capsule by mouth     • metFORMIN (GLUCOPHAGE-XR) 500 mg 24 hr tablet Take 2 tablets (1,000 mg total) by mouth 2 (two) times a day with meals 360 tablet 1   • metoprolol succinate (TOPROL-XL) 200 MG 24 hr tablet Take 200 mg by mouth daily     • ProAir RespiClick 444 (90 Base) MCG/ACT AEPB INHALE 1 PUFF BY MOUTH EVERY 4 HOURS AS NEEDED FOR DIFFICULT BREATHING     • valsartan (DIOVAN) 160 mg tablet Take 160 mg by mouth daily       No current facility-administered medications for this visit. Allergies   Allergen Reactions   • Codeine Other (See Comments)   • Levofloxacin Hives     lip swelling     • Metronidazole Swelling     Flagyl      • Cephalexin Rash     Keflex      • Penicillins Rash      Immunizations:     Immunization History   Administered Date(s) Administered   • COVID-19 PFIZER VACCINE 0.3 ML IM 02/05/2021, 02/26/2021, 08/25/2021   • COVID-19 Pfizer vac (Héctor-sucrose, gray cap) 12 yr+ IM 06/03/2022   • INFLUENZA 10/24/2011, 10/22/2012, 12/09/2013, 09/19/2014, 10/06/2015, 10/18/2016, 10/27/2017, 10/07/2018, 10/22/2019, 10/15/2021, 11/05/2022   • Influenza, Seasonal Vaccine, Quadrivalent, Adjuvanted, .5e 09/24/2020, 10/15/2021, 11/05/2022   • Pneumococcal Conjugate 13-Valent 01/13/2016   • Pneumococcal Polysaccharide PPV23 10/02/2009   • Td (adult), Unspecified 10/02/2008   • Tdap 08/30/2018   • Zoster 12/26/2008   • Zoster Vaccine Recombinant 08/30/2018, 02/03/2019   • influenza, trivalent, adjuvanted 10/07/2018, 09/24/2020      Health Maintenance:         Topic Date Due   • Hepatitis C Screening  Never done         Topic Date Due   • COVID-19 Vaccine (5 - Pfizer series) 07/29/2022   • Influenza Vaccine (1) 09/01/2023      Medicare Screening Tests and Risk Assessments:     Miya Garcia is here for her Subsequent Wellness visit. Health Risk Assessment:   Patient rates overall health as poor. Patient feels that their physical health rating is same. Patient is satisfied with their life. Eyesight was rated as same. Hearing was rated as same. Patient feels that their emotional and mental health rating is same. Patients states they are never, rarely angry. Patient states they are often unusually tired/fatigued.  Pain experienced in the last 7 days has been none. Patient states that she has experienced no weight loss or gain in last 6 months. Fall Risk Screening: In the past year, patient has experienced: no history of falling in past year      Urinary Incontinence Screening:   Patient has leaked urine accidently in the last six months. Home Safety:  Patient has trouble with stairs inside or outside of their home. Patient has working smoke alarms and has working carbon monoxide detector. Home safety hazards include: none. Nutrition:   Current diet is Diabetic and No Added Salt. Medications:   Patient is not currently taking any over-the-counter supplements. Patient is able to manage medications. Activities of Daily Living (ADLs)/Instrumental Activities of Daily Living (IADLs):   Walk and transfer into and out of bed and chair?: Yes  Dress and groom yourself?: Yes    Bathe or shower yourself?: Yes    Feed yourself?  Yes  Do your laundry/housekeeping?: Yes  Manage your money, pay your bills and track your expenses?: Yes  Make your own meals?: Yes    Do your own shopping?: Yes    Previous Hospitalizations:   Any hospitalizations or ED visits within the last 12 months?: Yes    How many hospitalizations have you had in the last year?: 1-2    Hospitalization Comments: ER visit    Advance Care Planning:   Living will: Yes    Durable POA for healthcare: No    Advanced directive: Yes      PREVENTIVE SCREENINGS      Cardiovascular Screening:    General: Screening Not Indicated and History Lipid Disorder      Diabetes Screening:     General: Screening Not Indicated and History Diabetes      Breast Cancer Screening:     General: Screening Not Indicated and History Breast Cancer      Cervical Cancer Screening:    General: Screening Not Indicated      Lung Cancer Screening:     General: Screening Not Indicated    Screening, Brief Intervention, and Referral to Treatment (SBIRT)    Screening  Typical number of drinks in a day: 0  Typical number of drinks in a week: 0  Interpretation: Low risk drinking behavior. AUDIT-C Screenin) How often did you have a drink containing alcohol in the past year? never  2) How many drinks did you have on a typical day when you were drinking in the past year? 0  3) How often did you have 6 or more drinks on one occasion in the past year? never    AUDIT-C Score: 0  Interpretation: Score 0-2 (female): Negative screen for alcohol misuse    Single Item Drug Screening:  How often have you used an illegal drug (including marijuana) or a prescription medication for non-medical reasons in the past year? never    Single Item Drug Screen Score: 0  Interpretation: Negative screen for possible drug use disorder    No results found.      Physical Exam:     /74   Pulse 81   Ht 5' 1" (1.549 m)   Wt 70 kg (154 lb 6.4 oz)   SpO2 99%   BMI 29.17 kg/m²     Physical Exam   General: NAD  HEENT: NCAT, EOMI, normal conjunctiva  Cardiovascular: RRR, normal S1 and S2, no m/r/g  Pulmonary: Normal respiratory effort, no wheezes, rales or rhonchi  GI: Soft, nontender, nondistended, normoactive bowel sounds  Musculoskeletal: Normal bulk and tone  Neuro: Non-focal, non-antalgic gait  Extremities: No lower extremity edema  Skin: Normal skin color, no rashes   Psychiatric: Normal mood and affect    Chandler Solares MD

## 2023-11-01 NOTE — ASSESSMENT & PLAN NOTE
-Followed by cardiology at [facility]  -Patient has been scheduled for an AV node ablation  -Flecainide has been discontinued by her cardiologist

## 2023-12-23 PROBLEM — R50.9 FEVER: Status: RESOLVED | Noted: 2023-10-24 | Resolved: 2023-12-23

## 2024-01-29 ENCOUNTER — OFFICE VISIT (OUTPATIENT)
Dept: PODIATRY | Facility: CLINIC | Age: 79
End: 2024-01-29
Payer: MEDICARE

## 2024-01-29 VITALS
HEART RATE: 80 BPM | BODY MASS INDEX: 30.43 KG/M2 | SYSTOLIC BLOOD PRESSURE: 106 MMHG | DIASTOLIC BLOOD PRESSURE: 78 MMHG | HEIGHT: 60 IN | WEIGHT: 155 LBS

## 2024-01-29 DIAGNOSIS — G57.93 NEUROPATHY INVOLVING BOTH LOWER EXTREMITIES: Primary | ICD-10-CM

## 2024-01-29 DIAGNOSIS — E11.9 TYPE II DIABETES MELLITUS, WELL CONTROLLED (HCC): ICD-10-CM

## 2024-01-29 PROCEDURE — 99203 OFFICE O/P NEW LOW 30 MIN: CPT | Performed by: PODIATRIST

## 2024-01-29 NOTE — PROGRESS NOTES
Assessment/Plan:       Diagnoses and all orders for this visit:    Neuropathy involving both lower extremities    Type II diabetes mellitus, well controlled (HCC)  -     Ambulatory Referral to Podiatry        Diagnosis and options discussed with patient  Patient agreeable to the plan as stated below    -DM foot risk is moderate (neuropathy). Recommend 6 month foot check for now, she is taking excellent care of her feet  -Discussed DM risk to lower extremities, proper shoe gear, and daily monitoring of feet.   -Discussed weight loss and suitable exercise regiment.   -Reviewed most recent PCP visit on 11/1/2023  -Educated on A1C and the risks of poorly controlled Diabetes. Reviewed recent A1C:  Lab Results   Component Value Date    HGBA1C 6.6 (A) 11/01/2023    HGBA1C 6.8 (H) 12/12/2022   .   Regarding her neuropathy pain, we discussed capsaicin, CBD cream/gummies. She prefers not to take a pill.      Subjective:      Patient ID: Zenaida Perry is a 78 y.o. female.    Patient was referred for DM foot check. She states she has neuropathy. She gets tingling and burning in her feet, especially at night. The burning at night began a year or so ago. She has CHF and gets swelling in her legs and feet. She is on elequis.         The following portions of the patient's history were reviewed and updated as appropriate: allergies, current medications, past family history, past medical history, past social history, past surgical history, and problem list.    Review of Systems    As stated in HPI, otherwise normal    Objective:      /78 (BP Location: Right arm, Patient Position: Sitting, Cuff Size: Standard)   Pulse 80   Ht 5' (1.524 m)   Wt 70.3 kg (155 lb)   BMI 30.27 kg/m²          Physical Exam  Vitals reviewed.   Cardiovascular:      Rate and Rhythm: Normal rate.      Pulses: Normal pulses. no weak pulses          Dorsalis pedis pulses are 2+ on the right side and 2+ on the left side.        Posterior tibial  pulses are 2+ on the right side and 2+ on the left side.   Pulmonary:      Effort: Pulmonary effort is normal. No respiratory distress.   Musculoskeletal:         General: No deformity.      Right lower leg: Edema present.      Left lower leg: Edema present.      Right foot: Normal range of motion. No deformity.      Left foot: Normal range of motion. No deformity.   Feet:      Right foot:      Protective Sensation: 10 sites tested.  0 sites sensed.      Skin integrity: No ulcer, skin breakdown, erythema, warmth, callus or dry skin.      Left foot:      Protective Sensation: 10 sites tested.  0 sites sensed.      Skin integrity: No ulcer, skin breakdown, erythema, warmth, callus or dry skin.   Skin:     Capillary Refill: Capillary refill takes less than 2 seconds.      Findings: No bruising or erythema.   Neurological:      Mental Status: She is alert and oriented to person, place, and time.      Sensory: Sensory deficit present.      Motor: No weakness.      Gait: Gait normal.             Diabetic Foot Exam    Patient's shoes and socks removed.    Right Foot/Ankle   Right Foot Inspection  Skin Exam: skin normal and skin intact. No dry skin, no warmth, no callus, no erythema, no maceration, no abnormal color, no pre-ulcer, no ulcer and no callus.     Toe Exam: ROM and strength within normal limits and swelling. No tenderness, erythema and  no right toe deformity    Sensory   Vibration: absent  Proprioception: absent  Monofilament testing: absent    Vascular  Capillary refills: < 3 seconds  The right DP pulse is 2+. The right PT pulse is 2+.     Left Foot/Ankle  Left Foot Inspection  Skin Exam: skin normal and skin intact. No dry skin, no warmth, no erythema, no maceration, normal color, no pre-ulcer, no ulcer and no callus.     Toe Exam: ROM and strength within normal limits and swelling. No tenderness, no erythema and no left toe deformity.     Sensory   Vibration: absent  Proprioception: absent  Monofilament  testing: absent    Vascular  Capillary refills: < 3 seconds  The left DP pulse is 2+. The left PT pulse is 2+.     Assign Risk Category  No deformity present  Loss of protective sensation  No weak pulses  Risk: 1

## 2024-01-29 NOTE — PATIENT INSTRUCTIONS
Foot Care for People with Diabetes   WHAT YOU NEED TO KNOW:   Long-term high blood sugar levels can damage the blood vessels and nerves in your legs and feet. This damage makes it hard to feel pressure, pain, temperature, and touch. You may not be able to feel a cut or sore, or shoes that are too tight. Foot care is needed to prevent serious problems, such as an infection or amputation. Diabetes may cause your toes to become crooked or curved under. These changes may affect the way you walk and can lead to increased pressure on your foot. The pressure can decrease blood flow to your feet. Lack of blood flow increases your risk for a foot ulcer.  DISCHARGE INSTRUCTIONS:   Call your care team provider if:   Your feet become numb, weak, or hard to move.    You have pus draining from a sore on your foot.    You have a wound on your foot that gets bigger, deeper, or does not heal.    You see blisters, cuts, scratches, calluses, or sores on your foot.    You have a fever, and your feet become red, warm, and swollen.    Your toenails become thick, curled, or yellow.    You find it hard to check your feet because your vision is poor.    You have questions or concerns about your condition or care.    Foot care:   Check your feet each day.  Look at your whole foot, including the bottom, and between and under your toes. Check for wounds, corns, and calluses. Use a mirror to see the bottom of your feet. The skin on your feet may be shiny, tight, or darker than normal. Your feet may also be cold and pale. Feel your feet by running your hands along the tops, bottoms, sides, and between your toes. Redness, swelling, and warmth are signs of blood flow problems that can lead to a foot ulcer. Do not try to remove corns or calluses yourself. Do not ignore small problems, such as dry skin or small wounds. These can become life-threatening over time without proper care.         Wash your feet each day with soap and warm water.  Do not  use hot water, because this can injure your foot. Dry your feet gently with a towel after you wash them. Dry between and under your toes.    Apply lotion or a moisturizer on your dry feet.  Ask your care team provider what lotions are best to use. Do not put lotion or moisturizer between your toes. Moisture between your toes could lead to skin breakdown.    Cut your toenails correctly.  File or cut your toenails straight across. Use a soft brush to clean around your toenails. If your toenails are very thick, you may need to have a care team provider or specialist cut them.    Protect your feet.  Do not walk barefoot or wear your shoes without socks. Check your shoes for rocks or other objects that can hurt your feet. Wear cotton socks to help keep your feet dry. Wear socks without toe seams, or wear them with the seams inside out. Change your socks each day. Do not wear socks that are dirty or damp.    Wear shoes that fit well.  Wear shoes that do not rub against any area of your feet. Your shoes should be ½ to ¾ inch (1 to 2 centimeters) longer than your feet. Your shoes should also have extra space around the widest part of your feet. Walking or athletic shoes with laces or straps that adjust are best. Ask your care team provider for help to choose shoes that fit you best. Ask your provider if you need to wear an insert, orthotic, or bandage on your feet.         Do not smoke.  Smoking can damage your blood vessels and put you at increased risk for foot ulcers. Ask your care team provider for information if you currently smoke and need help to quit. E-cigarettes or smokeless tobacco still contain nicotine. Talk to your care team provider before you use these products.    Follow up with your diabetes care team provider or foot specialist as directed:  You will need to have your feet checked at least 1 time each year. You may need a foot exam more often if you have nerve damage, foot deformities, or ulcers. Write  down your questions so you remember to ask them during your visits.  © Copyright [employer] 2023 Information is for End User's use only and may not be sold, redistributed or otherwise used for commercial purposes.  The above information is an  only. It is not intended as medical advice for individual conditions or treatments. Talk to your doctor, nurse or pharmacist before following any medical regimen to see if it is safe and effective for you.

## 2024-01-30 LAB
CREAT ?TM UR-SCNC: 103.4 UMOL/L
EXT ALBUMIN URINE RANDOM: 8.1
MICROALBUMIN/CREAT UR: 78.3 MG/G{CREAT}

## 2024-02-14 ENCOUNTER — RA CDI HCC (OUTPATIENT)
Dept: OTHER | Facility: HOSPITAL | Age: 79
End: 2024-02-14

## 2024-02-16 ENCOUNTER — OFFICE VISIT (OUTPATIENT)
Dept: INTERNAL MEDICINE CLINIC | Facility: CLINIC | Age: 79
End: 2024-02-16
Payer: MEDICARE

## 2024-02-16 VITALS
HEART RATE: 68 BPM | OXYGEN SATURATION: 99 % | BODY MASS INDEX: 30.9 KG/M2 | DIASTOLIC BLOOD PRESSURE: 82 MMHG | SYSTOLIC BLOOD PRESSURE: 138 MMHG | WEIGHT: 158.2 LBS

## 2024-02-16 DIAGNOSIS — N18.31 STAGE 3A CHRONIC KIDNEY DISEASE (CKD) (HCC): ICD-10-CM

## 2024-02-16 DIAGNOSIS — I48.0 PAF (PAROXYSMAL ATRIAL FIBRILLATION) (HCC): Primary | ICD-10-CM

## 2024-02-16 DIAGNOSIS — E11.9 TYPE II DIABETES MELLITUS, WELL CONTROLLED (HCC): ICD-10-CM

## 2024-02-16 DIAGNOSIS — C50.912 INFILTRATING DUCTAL CARCINOMA OF LEFT BREAST (HCC): ICD-10-CM

## 2024-02-16 DIAGNOSIS — N18.31 TYPE 2 DIABETES MELLITUS WITH STAGE 3A CHRONIC KIDNEY DISEASE, WITHOUT LONG-TERM CURRENT USE OF INSULIN (HCC): ICD-10-CM

## 2024-02-16 DIAGNOSIS — I10 ESSENTIAL HYPERTENSION: ICD-10-CM

## 2024-02-16 DIAGNOSIS — I50.32 CHRONIC DIASTOLIC CHF (CONGESTIVE HEART FAILURE) (HCC): ICD-10-CM

## 2024-02-16 DIAGNOSIS — E11.22 TYPE 2 DIABETES MELLITUS WITH STAGE 3A CHRONIC KIDNEY DISEASE, WITHOUT LONG-TERM CURRENT USE OF INSULIN (HCC): ICD-10-CM

## 2024-02-16 DIAGNOSIS — D33.3 VESTIBULAR SCHWANNOMA (HCC): ICD-10-CM

## 2024-02-16 PROCEDURE — 99214 OFFICE O/P EST MOD 30 MIN: CPT | Performed by: INTERNAL MEDICINE

## 2024-02-16 RX ORDER — METOPROLOL SUCCINATE 50 MG/1
50 TABLET, EXTENDED RELEASE ORAL DAILY
COMMUNITY

## 2024-02-16 RX ORDER — SPIRONOLACTONE 25 MG/1
25 TABLET ORAL DAILY
COMMUNITY
Start: 2023-12-13 | End: 2024-12-12

## 2024-02-16 NOTE — PROGRESS NOTES
Name: Zenaida Perry      : 1945      MRN: [id number]  Encounter Provider: Chandler Hannon MD  Encounter Date: 2024   Encounter department: [facility]    Assessment & Plan     1. PAF (paroxysmal atrial fibrillation) ([facility])  Assessment & Plan:  -s/p ablation   -AF burden has decreased   -Cardiology has Dc'd Cardizem and reduced Toprol dose to 50mg daily   -Continue Eliquis for AC       2. Essential hypertension  Assessment & Plan:  -Blood pressure well controlled  -Continue current antihypertensive regimen        3. Type II diabetes mellitus, well controlled ([facility])  -     Hemoglobin A1C; Future    4. Stage 3a chronic kidney disease (CKD) ([facility])  Assessment & Plan:  Lab Results   Component Value Date    EGFR 44 (L) 2024    EGFR 50 (L) 2024    EGFR 53 (L) 2023    CREATININE 1.23 (H) 2024    CREATININE 1.12 (H) 2024    CREATININE 1.06 2023     -GFR stable  -Avoid nephrotoxins      5. Infiltrating ductal carcinoma of left breast ([facility])  Assessment & Plan:  -Followed by oncology at [facility]  -Patient has completed 10 years of Arimidex      6. Vestibular schwannoma ([facility])  Assessment & Plan:  -Followed by neurosurgery at [place].  -Patient reports [facility] cannot perform her MRI due to her pacemaker.  She plans to contact Dr. Gibson's office to have them send an MRI order to [facility].        7. Chronic diastolic CHF (congestive heart failure) ([facility])  Assessment & Plan:  Wt Readings from Last 3 Encounters:   24 71.8 kg (158 lb 3.2 oz)   24 70.3 kg (155 lb)   23 70 kg (154 lb 6.4 oz)     -Appears well compensated   -Continue Toprol and Spironolactone as prescribed             8. Type 2 diabetes mellitus with stage 3a chronic kidney disease, without long-term current use of insulin ([facility])  Assessment & Plan:  -Well controlled   -Continue current dose of Metformin     Lab Results   Component Value Date    HGBA1C 6.6 (A) 2023               Subjective      HPI  Patient presents today for chronic follow-up.  Overall she states she is doing well and has no major complaints.  Since her last visit with me she underwent an ablation procedure through cardiology at [facility].  It was reported that her AF burden has noticeably decreased.  Her Cardizem has been discontinued and her Toprol dose has been reduced to 50 mg daily.    Regarding her history of vestibular schwannoma she attempted to have her MRI scheduled through [facility] but was told by radiology that this study could not be performed there due to her pacemaker.    For her history of type 2 diabetes mellitus she states she has been compliant with taking her metformin.  Her most recent hemoglobin A1c was 6.6.    All other systems negative except for pertinent findings noted in HPI.       Current Outpatient Medications on File Prior to Visit   Medication Sig   • apixaban (Eliquis) 5 mg Take 5 mg by mouth 2 (two) times a day   • atorvastatin (LIPITOR) 10 mg tablet Take 10 mg by mouth daily at bedtime   • metFORMIN (GLUCOPHAGE-XR) 500 mg 24 hr tablet Take 2 tablets (1,000 mg total) by mouth 2 (two) times a day with meals   • metoprolol succinate (Toprol XL) 50 mg 24 hr tablet Take 50 mg by mouth daily   • spironolactone (ALDACTONE) 25 mg tablet Take 25 mg by mouth daily   • valsartan (DIOVAN) 160 mg tablet Take 160 mg by mouth daily       Objective     /82 (BP Location: Left arm, Patient Position: Sitting, Cuff Size: Large)   Pulse 68   Wt 71.8 kg (158 lb 3.2 oz)   SpO2 99%   BMI 30.90 kg/m²     BP Readings from Last 3 Encounters:   02/16/24 138/82   01/29/24 106/78   11/01/23 116/74        Wt Readings from Last 3 Encounters:   02/16/24 71.8 kg (158 lb 3.2 oz)   01/29/24 70.3 kg (155 lb)   11/01/23 70 kg (154 lb 6.4 oz)       Physical Exam    General: NAD  HEENT: NCAT, EOMI, normal conjunctiva  Cardiovascular: RRR, normal S1 and S2, no m/r/g  Pulmonary: Normal respiratory effort, no wheezes,  rales or rhonchi  GI: Soft, nontender, nondistended, normoactive bowel sounds  MSK: Normal bulk and tone  Neuro: Ambulates with a cane for support  Extremities: No lower extremity edema  Skin: Normal skin color, no rashes     Chandler Hannon MD

## 2024-02-16 NOTE — ASSESSMENT & PLAN NOTE
-Well controlled   -Continue current dose of Metformin     Lab Results   Component Value Date    HGBA1C 6.6 (A) 11/01/2023

## 2024-02-16 NOTE — ASSESSMENT & PLAN NOTE
Wt Readings from Last 3 Encounters:   02/16/24 71.8 kg (158 lb 3.2 oz)   01/29/24 70.3 kg (155 lb)   11/01/23 70 kg (154 lb 6.4 oz)     -Appears well compensated   -Continue Toprol and Spironolactone as prescribed

## 2024-02-16 NOTE — ASSESSMENT & PLAN NOTE
-s/p ablation   -AF burden has decreased   -Cardiology has Dc'd Cardizem and reduced Toprol dose to 50mg daily   -Continue Eliquis for AC

## 2024-02-19 ENCOUNTER — TELEPHONE (OUTPATIENT)
Dept: ADMINISTRATIVE | Facility: OTHER | Age: 79
End: 2024-02-19

## 2024-02-19 PROBLEM — E11.22 TYPE 2 DIABETES MELLITUS WITH STAGE 3A CHRONIC KIDNEY DISEASE, WITHOUT LONG-TERM CURRENT USE OF INSULIN (HCC): Status: ACTIVE | Noted: 2023-06-21

## 2024-02-19 PROBLEM — N18.31 TYPE 2 DIABETES MELLITUS WITH STAGE 3A CHRONIC KIDNEY DISEASE, WITHOUT LONG-TERM CURRENT USE OF INSULIN (HCC): Status: ACTIVE | Noted: 2023-06-21

## 2024-02-19 NOTE — TELEPHONE ENCOUNTER
Upon review of the In Basket request we were able to locate, review, and update the patient chart as requested for DEXA Scan and Microalbumin Creatinine Urine Ratio OR Albumin Creatinine Urine Ratio .    Any additional questions or concerns should be emailed to the Practice Liaisons via the appropriate education email address, please do not reply via In Basket.    Thank you  Demetria Torres MA

## 2024-02-19 NOTE — ASSESSMENT & PLAN NOTE
Lab Results   Component Value Date    EGFR 44 (L) 01/30/2024    EGFR 50 (L) 01/04/2024    EGFR 53 (L) 12/11/2023    CREATININE 1.23 (H) 01/30/2024    CREATININE 1.12 (H) 01/04/2024    CREATININE 1.06 12/11/2023     -GFR stable  -Avoid nephrotoxins

## 2024-02-19 NOTE — ASSESSMENT & PLAN NOTE
-Followed by oncology at [facility]  -Patient has completed 10 years of Arimidex

## 2024-02-19 NOTE — TELEPHONE ENCOUNTER
----- Message from Chandler Hannon MD sent at 2/16/2024  1:42 PM EST -----  Regarding: Care Gap Request  02/16/24 1:42 PM    Hello, our patient attached above has had Urine Albumin/Creatinine Ratio completed/performed. Please assist in updating the patient chart by pulling the document from the Media Tab. The date of service is 1/30/24.     Thank you,  Chandler Hannon MD  [facility] [place]

## 2024-02-19 NOTE — ASSESSMENT & PLAN NOTE
-Followed by neurosurgery at [place].  -[facility].  She plans to contact Dr. Gibson's office to have them send an MRI order to [facility].

## 2024-02-19 NOTE — TELEPHONE ENCOUNTER
----- Message from Chandler Hannon MD sent at 2/16/2024  2:00 PM EST -----  Regarding: Care Gap Request  02/16/24 2:00 PM    Hello, our patient attached above has had DEXA Scan completed/performed. Please assist in updating the patient chart by pulling the Care Everywhere (CE) document. The date of service is 4/26/23.     Thank you,  Chandler Hannon MD  [facility] [place]

## 2024-06-03 DIAGNOSIS — E11.9 TYPE II DIABETES MELLITUS, WELL CONTROLLED (HCC): ICD-10-CM

## 2024-06-04 RX ORDER — METFORMIN HYDROCHLORIDE 500 MG/1
1000 TABLET, EXTENDED RELEASE ORAL 2 TIMES DAILY WITH MEALS
Qty: 360 TABLET | Refills: 1 | Status: SHIPPED | OUTPATIENT
Start: 2024-06-04

## 2024-06-18 ENCOUNTER — TELEPHONE (OUTPATIENT)
Age: 79
End: 2024-06-18

## 2024-06-18 DIAGNOSIS — R30.0 DYSURIA: Primary | ICD-10-CM

## 2024-06-18 NOTE — TELEPHONE ENCOUNTER
Pt. Called in and requested a script as she has a UTI, she does have an appt tomorrow but feels she just can't wait till tomorrows appt.    Please advise

## 2024-06-18 NOTE — TELEPHONE ENCOUNTER
Please notify the patient that I recommend going to an urgent care today or I can put in an order for urinalysis to be obtained at a [facility]'s lab.  I would like to confirm if she truly has a UTI since she has multiple antibiotic allergies including cephalosporins and fluoroquinolones.  Bactrim would be the only other option but this would need to be prescribed with caution given that she has CKD and is on potassium sparing medications.

## 2024-06-20 NOTE — TELEPHONE ENCOUNTER
LM for patient to call back for an update on her symptoms and to see if she received the message from our office.

## 2024-09-06 ENCOUNTER — OFFICE VISIT (OUTPATIENT)
Dept: INTERNAL MEDICINE CLINIC | Facility: CLINIC | Age: 79
End: 2024-09-06
Payer: MEDICARE

## 2024-09-06 VITALS
RESPIRATION RATE: 17 BRPM | WEIGHT: 154.6 LBS | OXYGEN SATURATION: 98 % | BODY MASS INDEX: 30.35 KG/M2 | TEMPERATURE: 97.4 F | SYSTOLIC BLOOD PRESSURE: 120 MMHG | HEART RATE: 78 BPM | DIASTOLIC BLOOD PRESSURE: 80 MMHG | HEIGHT: 60 IN

## 2024-09-06 DIAGNOSIS — D33.3 VESTIBULAR SCHWANNOMA (HCC): ICD-10-CM

## 2024-09-06 DIAGNOSIS — E11.22 TYPE 2 DIABETES MELLITUS WITH STAGE 3A CHRONIC KIDNEY DISEASE, WITHOUT LONG-TERM CURRENT USE OF INSULIN (HCC): ICD-10-CM

## 2024-09-06 DIAGNOSIS — N18.31 TYPE 2 DIABETES MELLITUS WITH STAGE 3A CHRONIC KIDNEY DISEASE, WITHOUT LONG-TERM CURRENT USE OF INSULIN (HCC): ICD-10-CM

## 2024-09-06 DIAGNOSIS — I48.0 PAF (PAROXYSMAL ATRIAL FIBRILLATION) (HCC): ICD-10-CM

## 2024-09-06 DIAGNOSIS — I50.32 CHRONIC DIASTOLIC CHF (CONGESTIVE HEART FAILURE) (HCC): Primary | ICD-10-CM

## 2024-09-06 DIAGNOSIS — E11.9 TYPE II DIABETES MELLITUS, WELL CONTROLLED (HCC): ICD-10-CM

## 2024-09-06 DIAGNOSIS — I10 ESSENTIAL HYPERTENSION: ICD-10-CM

## 2024-09-06 DIAGNOSIS — N18.31 STAGE 3A CHRONIC KIDNEY DISEASE (CKD) (HCC): ICD-10-CM

## 2024-09-06 LAB — SL AMB POCT HEMOGLOBIN AIC: 6.9 (ref ?–6.5)

## 2024-09-06 PROCEDURE — 83036 HEMOGLOBIN GLYCOSYLATED A1C: CPT | Performed by: INTERNAL MEDICINE

## 2024-09-06 PROCEDURE — 99214 OFFICE O/P EST MOD 30 MIN: CPT | Performed by: INTERNAL MEDICINE

## 2024-09-06 RX ORDER — FUROSEMIDE 20 MG
20 TABLET ORAL DAILY PRN
COMMUNITY
Start: 2024-03-28 | End: 2025-03-28

## 2024-09-06 RX ORDER — METFORMIN HCL 500 MG
500 TABLET, EXTENDED RELEASE 24 HR ORAL 2 TIMES DAILY WITH MEALS
Qty: 180 TABLET | Refills: 1 | Status: SHIPPED | OUTPATIENT
Start: 2024-09-06

## 2024-09-06 NOTE — PROGRESS NOTES
Name: Zenaida Perry      : 1945      MRN: [id number]  Encounter Provider: Chandler Hannon MD  Encounter Date: 2024   Encounter department: [facility]    Assessment & Plan     1. Chronic diastolic CHF (congestive heart failure) ([facility])  Assessment & Plan:  Wt Readings from Last 3 Encounters:   24 70.1 kg (154 lb 9.6 oz)   24 71.8 kg (158 lb 3.2 oz)   24 70.3 kg (155 lb)     -Appears well compensated.Appreciate cardiology follow-up.  -Continue current cardiac regimen as prescribed.      2. Type II diabetes mellitus, well controlled ([facility])  -     metFORMIN (GLUCOPHAGE-XR) 500 mg 24 hr tablet; Take 1 tablet (500 mg total) by mouth 2 (two) times a day with meals  3. Type 2 diabetes mellitus with stage 3a chronic kidney disease, without long-term current use of insulin ([facility])  Assessment & Plan:  -A1c controlled.  Patient's GI symptoms may be secondary to her metformin.  Recommended decreasing dose to 500 mg twice daily.  Plan to follow-up in 3 months to repeat A1c and to assess response.    Lab Results   Component Value Date    HGBA1C 6.9 (A) 2024     Orders:  -     Lipid Panel with Direct LDL reflex; Future  -     POCT hemoglobin A1c  4. Essential hypertension  Assessment & Plan:  -Blood pressure well controlled  -Continue current antihypertensive regimen    5. PAF (paroxysmal atrial fibrillation) ([facility])  Assessment & Plan:  - Stable. Appreciate Cardiology f/u at [facility]  - Continue Toprol XL 50 mg daily  - Continue AC with Eliquis 5 mg BID   6. Vestibular schwannoma ([facility])  Assessment & Plan:  -Patient planning to contact [facility] for a new MRI ordered  -She would like to have the procedure done locally at [facility] instead of [facility].  She reports [facility] will not perform the study due to her pacemaker leads.   7. Stage 3a chronic kidney disease (CKD) ([facility])  Assessment & Plan:  Lab Results   Component Value Date    EGFR 68 2024    EGFR 52 (L) 2024     EGFR 42 (L) 04/11/2024    CREATININE 0.86 07/08/2024    CREATININE 1.08 05/06/2024    CREATININE 1.30 (H) 04/11/2024     -GFR improved compared to previous reading  -Appreciate nephrology follow-up         Subjective      HPI  Patient presents today for chronic follow-up.  Her past medical history is notable for type 2 diabetes mellitus, chronic diastolic heart failure, paroxysmal atrial fibrillation and vestibular schwannoma.  Her main complaint today is intermittent abdominal discomfort.  She endorses intermittent pain along her lower abdomen in addition to bloating and gas.  She is concerned it may be related to her metformin.    Of note, the patient states she is still trying to schedule her follow-up brain MRI for evaluation of her schwannoma.  She states her neurosurgeon at [place] initially sent a prescription for an MRI to [facility] but she was told it could not be performed there due to her pacemaker.  She is in the process of having a prescription sent here to [facility] to see if it can be performed if not she has plans to go to [facility].      All other systems negative except for pertinent findings noted in HPI.       Current Outpatient Medications on File Prior to Visit   Medication Sig   • apixaban (Eliquis) 5 mg Take 5 mg by mouth 2 (two) times a day   • atorvastatin (LIPITOR) 10 mg tablet Take 10 mg by mouth daily at bedtime   • furosemide (LASIX) 20 mg tablet Take 20 mg by mouth daily as needed   • metoprolol succinate (Toprol XL) 50 mg 24 hr tablet Take 50 mg by mouth daily   • valsartan (DIOVAN) 160 mg tablet Take 160 mg by mouth daily   • [DISCONTINUED] metFORMIN (GLUCOPHAGE-XR) 500 mg 24 hr tablet Take 2 tablets (1,000 mg total) by mouth 2 (two) times a day with meals   • [DISCONTINUED] spironolactone (ALDACTONE) 25 mg tablet Take 25 mg by mouth daily       Objective     /80 (BP Location: Left arm, Patient Position: Sitting, Cuff Size: Standard)   Pulse 78   Temp (!) 97.4 °F (36.3  °C) (Tympanic)   Resp 17   Ht 5' (1.524 m)   Wt 70.1 kg (154 lb 9.6 oz)   SpO2 98%   BMI 30.19 kg/m²     BP Readings from Last 3 Encounters:   09/06/24 120/80   02/16/24 138/82   01/29/24 106/78        Wt Readings from Last 3 Encounters:   09/06/24 70.1 kg (154 lb 9.6 oz)   02/16/24 71.8 kg (158 lb 3.2 oz)   01/29/24 70.3 kg (155 lb)       Physical Exam    General: NAD  HEENT: NCAT, EOMI, normal conjunctiva  Cardiovascular: RRR, normal S1 and S2, no m/r/g  Pulmonary: Normal respiratory effort, no wheezes, rales or rhonchi  GI: Soft, nontender, nondistended, normoactive bowel sounds  MSK: Normal bulk and tone  Extremities: No lower extremity edema  Skin: Normal skin color, no rashes     Chandler Hannon MD

## 2024-09-06 NOTE — ASSESSMENT & PLAN NOTE
-Patient planning to contact [facility] for a new MRI ordered  -She would like to have the procedure done locally at [facility] instead of [facility].  She reports [facility] will not perform the study due to her pacemaker leads.

## 2024-09-06 NOTE — PATIENT INSTRUCTIONS
-Consider over the counter simethicone for gas and bloating   -Please reduce Metformin to 1 tablet twice a day

## 2024-09-06 NOTE — ASSESSMENT & PLAN NOTE
- Stable. Apprec[facility]  - Continue Toprol XL 50 mg daily  - Continue AC with Eliquis 5 mg BID

## 2024-09-06 NOTE — ASSESSMENT & PLAN NOTE
-A1c controlled.  Patient's GI symptoms may be secondary to her metformin.  Recommended decreasing dose to 500 mg twice daily.  Plan to follow-up in 3 months to repeat A1c and to assess response.    Lab Results   Component Value Date    HGBA1C 6.9 (A) 09/06/2024

## 2024-09-06 NOTE — ASSESSMENT & PLAN NOTE
Lab Results   Component Value Date    EGFR 68 07/08/2024    EGFR 52 (L) 05/06/2024    EGFR 42 (L) 04/11/2024    CREATININE 0.86 07/08/2024    CREATININE 1.08 05/06/2024    CREATININE 1.30 (H) 04/11/2024     -GFR improved compared to previous reading  -Appreciate nephrology follow-up

## 2024-09-06 NOTE — ASSESSMENT & PLAN NOTE
Wt Readings from Last 3 Encounters:   09/06/24 70.1 kg (154 lb 9.6 oz)   02/16/24 71.8 kg (158 lb 3.2 oz)   01/29/24 70.3 kg (155 lb)     -Appears well compensated.Appreciate cardiology follow-up.  -Continue current cardiac regimen as prescribed.

## 2025-01-22 ENCOUNTER — RA CDI HCC (OUTPATIENT)
Dept: OTHER | Facility: HOSPITAL | Age: 80
End: 2025-01-22

## 2025-01-30 ENCOUNTER — OFFICE VISIT (OUTPATIENT)
Dept: INTERNAL MEDICINE CLINIC | Facility: CLINIC | Age: 80
End: 2025-01-30
Payer: MEDICARE

## 2025-01-30 VITALS
WEIGHT: 157.6 LBS | HEART RATE: 95 BPM | BODY MASS INDEX: 29 KG/M2 | HEIGHT: 62 IN | SYSTOLIC BLOOD PRESSURE: 140 MMHG | DIASTOLIC BLOOD PRESSURE: 74 MMHG | OXYGEN SATURATION: 100 %

## 2025-01-30 DIAGNOSIS — N18.31 TYPE 2 DIABETES MELLITUS WITH STAGE 3A CHRONIC KIDNEY DISEASE, WITHOUT LONG-TERM CURRENT USE OF INSULIN (HCC): ICD-10-CM

## 2025-01-30 DIAGNOSIS — Z00.00 MEDICARE ANNUAL WELLNESS VISIT, SUBSEQUENT: Primary | ICD-10-CM

## 2025-01-30 DIAGNOSIS — C50.912 INFILTRATING DUCTAL CARCINOMA OF LEFT BREAST (HCC): ICD-10-CM

## 2025-01-30 DIAGNOSIS — E11.22 TYPE 2 DIABETES MELLITUS WITH STAGE 3A CHRONIC KIDNEY DISEASE, WITHOUT LONG-TERM CURRENT USE OF INSULIN (HCC): ICD-10-CM

## 2025-01-30 DIAGNOSIS — I50.32 CHRONIC DIASTOLIC CHF (CONGESTIVE HEART FAILURE) (HCC): ICD-10-CM

## 2025-01-30 DIAGNOSIS — D33.3 VESTIBULAR SCHWANNOMA (HCC): ICD-10-CM

## 2025-01-30 DIAGNOSIS — I27.20 PULMONARY HYPERTENSION (HCC): ICD-10-CM

## 2025-01-30 DIAGNOSIS — I10 ESSENTIAL HYPERTENSION: ICD-10-CM

## 2025-01-30 DIAGNOSIS — I48.0 PAF (PAROXYSMAL ATRIAL FIBRILLATION) (HCC): ICD-10-CM

## 2025-01-30 DIAGNOSIS — I35.0 NON-RHEUMATIC AORTIC STENOSIS: ICD-10-CM

## 2025-01-30 PROBLEM — R31.9 HEMATURIA: Status: RESOLVED | Noted: 2023-11-01 | Resolved: 2025-01-30

## 2025-01-30 PROBLEM — R31.0 GROSS HEMATURIA: Status: RESOLVED | Noted: 2023-06-21 | Resolved: 2025-01-30

## 2025-01-30 LAB — SL AMB POCT HEMOGLOBIN AIC: 7.6 (ref ?–6.5)

## 2025-01-30 PROCEDURE — G0439 PPPS, SUBSEQ VISIT: HCPCS | Performed by: INTERNAL MEDICINE

## 2025-01-30 PROCEDURE — 83036 HEMOGLOBIN GLYCOSYLATED A1C: CPT | Performed by: INTERNAL MEDICINE

## 2025-01-30 NOTE — PROGRESS NOTES
Name: Zenaida Perry      : 1945      MRN: [id number]  Encounter Provider: Chandler Hannon MD  Encounter Date: 2025   Encounter department: [facility]  Medicare annual wellness visit, subsequent       Non-rheumatic aortic stenosis  -Moderate aortic stenosis by gradient but severe by valve area. Appreciate close f/u with Cardiology and Cardiothoracic surgery.  Underwent cardiac cath in 2024 which did not reveal any evidence of significant coronary artery disease.       Chronic diastolic CHF (congestive heart failure) (HCC)  Wt Readings from Last 3 Encounters:   25 71.5 kg (157 lb 9.6 oz)   24 70.1 kg (154 lb 9.6 oz)   24 71.8 kg (158 lb 3.2 oz)     -Appears well compensated  -Continue Lasix as prescribed   -Continue f.u with Cardiology as scheduled       Pulmonary hypertension (HCC)  -Moderate pulmonary artery hypertension. Appreciate Cardiology f/u.        PAF (paroxysmal atrial fibrillation) (HCC)  -Rate controlled   -Continue Eliquis BID        Infiltrating ductal carcinoma of left breast (HCC)  -Followed by oncology at [facility]  -Patient has completed 10 years of Arimidex  -Has f/u with Oncology next week        Type 2 diabetes mellitus with stage 3a chronic kidney disease, without long-term current use of insulin ([facility])  -A1c has increased to 7.6  -Plan to repeat in 4 months  -Continue Metformin 1000mg daily     Lab Results   Component Value Date    HGBA1C 7.6 (A) 2025     Orders:  •  POCT hemoglobin A1c  •  Albumin / creatinine urine ratio; Future    Essential hypertension  -Blood pressure well controlled  -Continue current antihypertensive regimen       Vestibular schwannoma (HCC)  -Patient planning to contact [place] for a new MRI order to be sent to our network.  States [facility] will not perform the MRI due to her pacemaker leads.            Preventive health issues were discussed with patient, and age appropriate  screening tests were ordered as noted in patient's After Visit Summary. Personalized health advice and appropriate referrals for health education or preventive services given if needed, as noted in patient's After Visit Summary.    History of Present Illness     HPI   Patient presents today for her Medicare wellness visit.  Overall she states she is doing well and has no major complaints.  Most recently she was seen by cardiology who recommended a cardiac cath given her severe aortic stenosis.  Her catheterization was performed in December and did not reveal any significant coronary artery disease.  She had subsequent follow-up with cardiothoracic surgery who recommended continued surveillance since she is not significantly symptomatic at this time.    Patient Care Team:  Chandler Hannon MD as PCP - General (Internal Medicine)  Chandler Hannon MD (Internal Medicine)    Review of Systems  All other systems negative except for pertinent findings noted in HPI.     Medical History Reviewed by provider this encounter:       Annual Wellness Visit Questionnaire   Zenaida is here for her Subsequent Wellness visit.     Health Risk Assessment:   Patient rates overall health as fair. Patient feels that their physical health rating is slightly worse. Patient is satisfied with their life. Eyesight was rated as slightly worse. Hearing was rated as slightly worse. Patient feels that their emotional and mental health rating is same. Patients states they are never, rarely angry. Patient states they are often unusually tired/fatigued. Pain experienced in the last 7 days has been none. Patient states that she has experienced no weight loss or gain in last 6 months.     Depression Screening:   PHQ-2 Score: 0      Fall Risk Screening:   In the past year, patient has experienced: no history of falling in past year      Urinary Incontinence Screening:   Patient has leaked urine accidently in the last six months.     Home  Safety:  Patient has trouble with stairs inside or outside of their home. Patient has working smoke alarms and has working carbon monoxide detector. Home safety hazards include: none.     Nutrition:   Current diet is Regular.     Medications:   Patient is not currently taking any over-the-counter supplements. Patient is able to manage medications.     Activities of Daily Living (ADLs)/Instrumental Activities of Daily Living (IADLs):   Walk and transfer into and out of bed and chair?: Yes  Dress and groom yourself?: Yes    Bathe or shower yourself?: Yes    Feed yourself? Yes  Do your laundry/housekeeping?: Yes  Manage your money, pay your bills and track your expenses?: Yes  Make your own meals?: Yes    Do your own shopping?: Yes    Previous Hospitalizations:   Any hospitalizations or ED visits within the last 12 months?: No      Advance Care Planning:   Living will: No    Durable POA for healthcare: No    Advanced directive: No      PREVENTIVE SCREENINGS      Cardiovascular Screening:    General: Screening Not Indicated and History Lipid Disorder      Diabetes Screening:     General: Screening Not Indicated and History Diabetes      Breast Cancer Screening:     General: Screening Not Indicated and History Breast Cancer      Cervical Cancer Screening:    General: Screening Not Indicated      Lung Cancer Screening:     General: Screening Not Indicated    Screening, Brief Intervention, and Referral to Treatment (SBIRT)    Screening  Typical number of drinks in a day: 0  Typical number of drinks in a week: 0  Interpretation: Low risk drinking behavior.    AUDIT-C Screenin) How often did you have a drink containing alcohol in the past year? never  2) How many drinks did you have on a typical day when you were drinking in the past year? 0  3) How often did you have 6 or more drinks on one occasion in the past year? never    AUDIT-C Score: 0  Interpretation: Score 0-2 (female): Negative screen for alcohol  "misuse    Single Item Drug Screening:  How often have you used an illegal drug (including marijuana) or a prescription medication for non-medical reasons in the past year? never    Single Item Drug Screen Score: 0  Interpretation: Negative screen for possible drug use disorder    Social Drivers of Health     Financial Resource Strain: Not At Risk (1/28/2025)    Received from [facility]    Financial Insecurity    • In the last 12 months did you skip medications to save money?: No    • In the last 12 months was there a time when you needed to see a doctor but could not because of cost?: No   Food Insecurity: No Food Insecurity (1/28/2025)    Received from [facility]    Food Insecurity    • In the last 12 months did you ever eat less than you felt you should because there wasn't enough money for food?: No   Transportation Needs: No Transportation Needs (1/28/2025)    Received from [facility]    Transportation Needs    • In the last 12 months have you ever had to go without healthcare because you didn't have a way to get there?: No   Housing Stability: Not At Risk (1/28/2025)    Received from [facility]    Housing Stability    • Are you worried that in the next 2 months you may not have stable housing?: No   Utilities: Not At Risk (1/28/2025)    Received from [facility]    Utilities Insecurity    • In the last 12 months has the electric, gas, oil, or water company threatened to shut off services in your home?: No     No results found.    Objective   /74 (BP Location: Left arm, Patient Position: Sitting, Cuff Size: Large)   Pulse 95   Ht 5' 1.52\" (1.563 m)   Wt 71.5 kg (157 lb 9.6 oz)   SpO2 100%   BMI 29.27 kg/m²     Physical Exam  Cardiovascular:      Pulses: no weak pulses.           Dorsalis pedis pulses are 2+ on the right side and 2+ on the left side.        Posterior tibial pulses are 2+ on the right side and 2+ on " the left side.   Feet:      Right foot:      Skin integrity: Dry skin present. No ulcer, skin breakdown, erythema, warmth or callus.      Left foot:      Skin integrity: Dry skin present. No ulcer, skin breakdown, erythema, warmth or callus.       General: NAD  HEENT: NCAT, EOMI, normal conjunctiva  Cardiovascular: RRR, normal S1 and S2, no m/r/g  Pulmonary: Normal respiratory effort, no wheezes, rales or rhonchi  GI: Soft, nontender, nondistended, normoactive bowel sounds  Musculoskeletal: Normal bulk and tone  Neuro: Non-focal, ambulating without difficulty, non-antalgic gait  Extremities: No lower extremity edema  Skin: Normal skin color, no rashes   Psychiatric: Normal mood and affect    Diabetic Foot Exam    Patient's shoes and socks removed.    Right Foot/Ankle   Right Foot Inspection  Skin Exam: skin normal, skin intact and dry skin. No warmth, no callus, no erythema, no maceration, no abnormal color, no pre-ulcer, no ulcer and no callus.     Toe Exam: ROM and strength within normal limits. No swelling, no tenderness, erythema and  no right toe deformity    Sensory   Monofilament testing: diminished    Vascular  Capillary refills: < 3 seconds  The right DP pulse is 2+. The right PT pulse is 2+.     Left Foot/Ankle  Left Foot Inspection  Skin Exam: skin normal, skin intact and dry skin. No warmth, no erythema, no maceration, normal color, no pre-ulcer, no ulcer and no callus.     Toe Exam: ROM and strength within normal limits. No swelling, no tenderness, no erythema and no left toe deformity.     Sensory   Monofilament testing: diminished    Vascular  Capillary refills: < 3 seconds  The left DP pulse is 2+. The left PT pulse is 2+.     Assign Risk Category  No deformity present  Loss of protective sensation  No weak pulses  Risk: 1

## 2025-01-30 NOTE — ASSESSMENT & PLAN NOTE
-Patient planning to contact [facility] for a new MRI order to be sent to our network.  [facility].

## 2025-01-30 NOTE — ASSESSMENT & PLAN NOTE
-Followed by oncology at [facility]  -Patient has completed 10 years of Arimidex  -Has f/u with Oncology next week

## 2025-01-30 NOTE — ASSESSMENT & PLAN NOTE
Wt Readings from Last 3 Encounters:   01/30/25 71.5 kg (157 lb 9.6 oz)   09/06/24 70.1 kg (154 lb 9.6 oz)   02/16/24 71.8 kg (158 lb 3.2 oz)     -Appears well compensated  -Continue Lasix as prescribed   -Continue f.u with Cardiology as scheduled

## 2025-01-30 NOTE — PATIENT INSTRUCTIONS
[facility] EMERGENCY DEPT  EMERGENCY DEPARTMENT ENCOUNTER       Pt Name: Catie Means  MRN: [id number]  Birthdate 1963  Date of evaluation: 4/28/2024  PCP: Evonne Donnelly APRN - NP  Note Started: 11:09 PM 4/28/24     CHIEF COMPLAINT       Chief Complaint   Patient presents with    Foot Pain     left        HISTORY OF PRESENT ILLNESS: 1 or more elements      History From: Patient  HPI Limitations: None  Chronic Conditions: Hypertension, GERD, anxiety, fibromyalgia  Social Determinants affecting Dx or Tx: none      Catie Means is a 60 y.o. female with history of hypertension who presents to ED c/o left foot pain.  Patient reports she kicked her left foot into a wall approximately 3 days ago.  No additional injury, no fall, no focal weakness or paresthesias.  Patient reports she did not take her blood pressure medication tonight.  Patient denies headache, dizziness, vision changes, chest pain, shortness of breath, focal weakness or paresthesias.     Nursing Notes were all reviewed and agreed with or any disagreements were addressed in the HPI.    PAST HISTORY     Past Medical History:  Past Medical History:   Diagnosis Date    Asthma     uses inhaler \"once or twice a year\"    Chronic pain     Depression     Fibromyalgia     GERD (gastroesophageal reflux disease)     Hypertension 2008    Menopause     age 49    Obese     Rash     under pannus    Renal cyst     small cyst on right kidney    Severe obesity (BMI 35.0-39.9) with comorbidity (HCC)     Smoking     pt has quit and restarted several times in life    Unspecified sleep apnea     Supposed to use CPAP but cannot afford.       Past Surgical History:  Past Surgical History:   Procedure Laterality Date    KNEE ARTHROSCOPY Right     LAPAROTOMY  mid 1980s    related to ectopic pregnancy    ORTHOPEDIC SURGERY Right     carpal tunnel release    SALPINGO-OOPHORECTOMY      related to ectopic pregnancy       Family History:  Family History  Medicare Preventive Visit Patient Instructions  Thank you for completing your Welcome to Medicare Visit or Medicare Annual Wellness Visit today. Your next wellness visit will be due in one year (1/31/2026).  The screening/preventive services that you may require over the next 5-10 years are detailed below. Some tests may not apply to you based off risk factors and/or age. Screening tests ordered at today's visit but not completed yet may show as past due. Also, please note that scanned in results may not display below.  Preventive Screenings:  Service Recommendations Previous Testing/Comments   Colorectal Cancer Screening  * Colonoscopy    * Fecal Occult Blood Test (FOBT)/Fecal Immunochemical Test (FIT)  * Fecal DNA/Cologuard Test  * Flexible Sigmoidoscopy Age: 45-75 years old   Colonoscopy: every 10 years (may be performed more frequently if at higher risk)  OR  FOBT/FIT: every 1 year  OR  Cologuard: every 3 years  OR  Sigmoidoscopy: every 5 years  Screening may be recommended earlier than age 45 if at higher risk for colorectal cancer. Also, an individualized decision between you and your healthcare provider will decide whether screening between the ages of 76-85 would be appropriate. Colonoscopy: Not on file  FOBT/FIT: Not on file  Cologuard: Not on file  Sigmoidoscopy: Not on file          Breast Cancer Screening Age: 40+ years old  Frequency: every 1-2 years  Not required if history of left and right mastectomy Mammogram: Not on file    Screening Not Indicated  History Breast Cancer   Cervical Cancer Screening Between the ages of 21-29, pap smear recommended once every 3 years.   Between the ages of 30-65, can perform pap smear with HPV co-testing every 5 years.   Recommendations may differ for women with a history of total hysterectomy, cervical cancer, or abnormal pap smears in past. Pap Smear: Not on file    Screening Not Indicated   Hepatitis C Screening Once for adults born between 1945 and 1965  More  frequently in patients at high risk for Hepatitis C Hep C Antibody: Not on file        Diabetes Screening 1-2 times per year if you're at risk for diabetes or have pre-diabetes Fasting glucose: No results in last 5 years (No results in last 5 years)  A1C: 6.9 (9/6/2024)  Screening Not Indicated  History Diabetes   Cholesterol Screening Once every 5 years if you don't have a lipid disorder. May order more often based on risk factors. Lipid panel: 12/12/2022    Screening Not Indicated  History Lipid Disorder     Other Preventive Screenings Covered by Medicare:  Abdominal Aortic Aneurysm (AAA) Screening: covered once if your at risk. You're considered to be at risk if you have a family history of AAA.  Lung Cancer Screening: covers low dose CT scan once per year if you meet all of the following conditions: (1) Age 55-77; (2) No signs or symptoms of lung cancer; (3) Current smoker or have quit smoking within the last 15 years; (4) You have a tobacco smoking history of at least 20 pack years (packs per day multiplied by number of years you smoked); (5) You get a written order from a healthcare provider.  Glaucoma Screening: covered annually if you're considered high risk: (1) You have diabetes OR (2) Family history of glaucoma OR (3)  aged 50 and older OR (4)  American aged 65 and older  Osteoporosis Screening: covered every 2 years if you meet one of the following conditions: (1) You're estrogen deficient and at risk for osteoporosis based off medical history and other findings; (2) Have a vertebral abnormality; (3) On glucocorticoid therapy for more than 3 months; (4) Have primary hyperparathyroidism; (5) On osteoporosis medications and need to assess response to drug therapy.   Last bone density test (DXA Scan): 04/26/2023.  HIV Screening: covered annually if you're between the age of 15-65. Also covered annually if you are younger than 15 and older than 65 with risk factors for HIV infection.  For pregnant patients, it is covered up to 3 times per pregnancy.    Immunizations:  Immunization Recommendations   Influenza Vaccine Annual influenza vaccination during flu season is recommended for all persons aged >= 6 months who do not have contraindications   Pneumococcal Vaccine   * Pneumococcal conjugate vaccine = PCV13 (Prevnar 13), PCV15 (Vaxneuvance), PCV20 (Prevnar 20)  * Pneumococcal polysaccharide vaccine = PPSV23 (Pneumovax) Adults 19-65 yo with certain risk factors or if 65+ yo  If never received any pneumonia vaccine: recommend Prevnar 20 (PCV20)  Give PCV20 if previously received 1 dose of PCV13 or PPSV23   Hepatitis B Vaccine 3 dose series if at intermediate or high risk (ex: diabetes, end stage renal disease, liver disease)   Respiratory syncytial virus (RSV) Vaccine - COVERED BY MEDICARE PART D  * RSVPreF3 (Arexvy) CDC recommends that adults 60 years of age and older may receive a single dose of RSV vaccine using shared clinical decision-making (SCDM)   Tetanus (Td) Vaccine - COST NOT COVERED BY MEDICARE PART B Following completion of primary series, a booster dose should be given every 10 years to maintain immunity against tetanus. Td may also be given as tetanus wound prophylaxis.   Tdap Vaccine - COST NOT COVERED BY MEDICARE PART B Recommended at least once for all adults. For pregnant patients, recommended with each pregnancy.   Shingles Vaccine (Shingrix) - COST NOT COVERED BY MEDICARE PART B  2 shot series recommended in those 19 years and older who have or will have weakened immune systems or those 50 years and older     Health Maintenance Due:  There are no preventive care reminders to display for this patient.  Immunizations Due:      Topic Date Due   • COVID-19 Vaccine (5 - 2024-25 season) 09/01/2024     Advance Directives   What are advance directives?  Advance directives are legal documents that state your wishes and plans for medical care. These plans are made ahead of time in case  you lose your ability to make decisions for yourself. Advance directives can apply to any medical decision, such as the treatments you want, and if you want to donate organs.   What are the types of advance directives?  There are many types of advance directives, and each state has rules about how to use them. You may choose a combination of any of the following:  Living will:  This is a written record of the treatment you want. You can also choose which treatments you do not want, which to limit, and which to stop at a certain time. This includes surgery, medicine, IV fluid, and tube feedings.   Durable power of  for healthcare (DPAHC):  This is a written record that states who you want to make healthcare choices for you when you are unable to make them for yourself. This person, called a proxy, is usually a family member or a friend. You may choose more than 1 proxy.  Do not resuscitate (DNR) order:  A DNR order is used in case your heart stops beating or you stop breathing. It is a request not to have certain forms of treatment, such as CPR. A DNR order may be included in other types of advance directives.  Medical directive:  This covers the care that you want if you are in a coma, near death, or unable to make decisions for yourself. You can list the treatments you want for each condition. Treatment may include pain medicine, surgery, blood transfusions, dialysis, IV or tube feedings, and a ventilator (breathing machine).  Values history:  This document has questions about your views, beliefs, and how you feel and think about life. This information can help others choose the care that you would choose.  Why are advance directives important?  An advance directive helps you control your care. Although spoken wishes may be used, it is better to have your wishes written down. Spoken wishes can be misunderstood, or not followed. Treatments may be given even if you do not want them. An advance directive may  make it easier for your family to make difficult choices about your care.   Urinary Incontinence   Urinary incontinence (UI)  is when you lose control of your bladder. UI develops because your bladder cannot store or empty urine properly. The 3 most common types of UI are stress incontinence, urge incontinence, or both.  Medicines:   May be given to help strengthen your bladder control. Report any side effects of medication to your healthcare provider.  Do pelvic muscle exercises often:  Your pelvic muscles help you stop urinating. Squeeze these muscles tight for 5 seconds, then relax for 5 seconds. Gradually work up to squeezing for 10 seconds. Do 3 sets of 15 repetitions a day, or as directed. This will help strengthen your pelvic muscles and improve bladder control.  Train your bladder:  Go to the bathroom at set times, such as every 2 hours, even if you do not feel the urge to go. You can also try to hold your urine when you feel the urge to go. For example, hold your urine for 5 minutes when you feel the urge to go. As that becomes easier, hold your urine for 10 minutes.   Self-care:   Keep a UI record.  Write down how often you leak urine and how much you leak. Make a note of what you were doing when you leaked urine.  Drink liquids as directed. You may need to limit the amount of liquid you drink to help control your urine leakage. Do not drink any liquid right before you go to bed. Limit or do not have drinks that contain caffeine or alcohol.   Prevent constipation.  Eat a variety of high-fiber foods. Good examples are high-fiber cereals, beans, vegetables, and whole-grain breads. Walking is the best way to trigger your intestines to have a bowel movement.  Exercise regularly and maintain a healthy weight.  Weight loss and exercise will decrease pressure on your bladder and help you control your leakage.   Use a catheter as directed  to help empty your bladder. A catheter is a tiny, plastic tube that is put  into your bladder to drain your urine.   Go to behavior therapy as directed.  Behavior therapy may be used to help you learn to control your urge to urinate.    Weight Management   Why it is important to manage your weight:  Being overweight increases your risk of health conditions such as heart disease, high blood pressure, type 2 diabetes, and certain types of cancer. It can also increase your risk for osteoarthritis, sleep apnea, and other respiratory problems. Aim for a slow, steady weight loss. Even a small amount of weight loss can lower your risk of health problems.  How to lose weight safely:  A safe and healthy way to lose weight is to eat fewer calories and get regular exercise. You can lose up about 1 pound a week by decreasing the number of calories you eat by 500 calories each day.   Healthy meal plan for weight management:  A healthy meal plan includes a variety of foods, contains fewer calories, and helps you stay healthy. A healthy meal plan includes the following:  Eat whole-grain foods more often.  A healthy meal plan should contain fiber. Fiber is the part of grains, fruits, and vegetables that is not broken down by your body. Whole-grain foods are healthy and provide extra fiber in your diet. Some examples of whole-grain foods are whole-wheat breads and pastas, oatmeal, brown rice, and bulgur.  Eat a variety of vegetables every day.  Include dark, leafy greens such as spinach, kale, edna greens, and mustard greens. Eat yellow and orange vegetables such as carrots, sweet potatoes, and winter squash.   Eat a variety of fruits every day.  Choose fresh or canned fruit (canned in its own juice or light syrup) instead of juice. Fruit juice has very little or no fiber.  Eat low-fat dairy foods.  Drink fat-free (skim) milk or 1% milk. Eat fat-free yogurt and low-fat cottage cheese. Try low-fat cheeses such as mozzarella and other reduced-fat cheeses.  Choose meat and other protein foods that are low  "in fat.  Choose beans or other legumes such as split peas or lentils. Choose fish, skinless poultry (chicken or turkey), or lean cuts of red meat (beef or pork). Before you cook meat or poultry, cut off any visible fat.   Use less fat and oil.  Try baking foods instead of frying them. Add less fat, such as margarine, sour cream, regular salad dressing and mayonnaise to foods. Eat fewer high-fat foods. Some examples of high-fat foods include french fries, doughnuts, ice cream, and cakes.  Eat fewer sweets.  Limit foods and drinks that are high in sugar. This includes candy, cookies, regular soda, and sweetened drinks.  Exercise:  Exercise at least 30 minutes per day on most days of the week. Some examples of exercise include walking, biking, dancing, and swimming. You can also fit in more physical activity by taking the stairs instead of the elevator or parking farther away from stores. Ask your healthcare provider about the best exercise plan for you.      © Copyright "[employer] 2018 Information is for End User's use only and may not be sold, redistributed or otherwise used for commercial purposes. All illustrations and images included in CareNotes® are the copyrighted property of A.D.A.M., Inc. or [employer]      Patient Education     Urinary incontinence in females   The Basics   Written by the doctors and editors at [facility]   What is urinary incontinence? -- Urinary incontinence is the medical term for when a person leaks urine or loses bladder control.  Incontinence is a very common problem, but it is not a normal part of aging. If you have this problem, there are treatments that can help. There are also things that you can do on your own to stop or reduce urine leakage so you don't have to \"just live with it.\"  What are the symptoms of incontinence? -- There are different types of incontinence. Each causes different symptoms. The 3 most common types are:   Stress incontinence - With stress " "incontinence, you leak urine when you laugh, cough, sneeze, or do anything that \"stresses\" the belly. Stress incontinence is most common in females, especially those who have had a baby.   Urgency incontinence - With urgency incontinence, you feel a strong need to urinate all of a sudden. This is also known as \"urge incontinence.\" Often, the \"urge\" is so strong that you can't make it to the bathroom in time. \"Overactive bladder\" is another term for having a sudden, frequent urge to urinate. People with overactive bladder might or might not actually leak urine.   Mixed incontinence - With mixed incontinence, you have symptoms of both stress and urgency incontinence.  Is there anything I can do on my own to feel better? -- Yes. Here are some things that can help reduce urine leaks:   Reduce the amount of liquid that you drink, especially a few hours before bed.   Cut down on any foods or drinks that make your symptoms worse. Some people find that alcohol, caffeine, or spicy or acidic foods irritate the bladder.   Try to lose weight, if you are overweight. Your doctor or nurse can help you do this in a healthy way.   If you have diabetes, keep your blood sugar as close to your goal level as possible.   If you take medicines called diuretics, plan ahead. These medicines increase the need to urinate. Try to take them when you know you will be near a bathroom for a few hours. If you keep having problems with leakage because of diuretics, ask your doctor if you can take a lower dose or switch to a different medicine.  These techniques can also help improve bladder control:   Bladder retraining - During bladder retraining, you go to the bathroom at scheduled times. For instance, you might decide that you will go every hour. Make yourself go every hour, even if you don't feel like you need to. Try to wait the whole hour, even if you need to go sooner. Then, once you get used to going every hour, increase the amount of time " "you wait in between bathroom visits. Over time, you might be able to \"retrain\" your bladder to wait 3 or 4 hours between bathroom visits.   Pelvic floor muscle training - This involves learning exercises to strengthen and relax your pelvic muscles. These include the muscles that control the flow of urine and bowel movements. When done right, these exercises can help. But people often do them wrong. Ask your doctor or nurse how to do them right. Your doctor might suggest working with a physical therapist who has special training in these exercises.  Should I see my doctor or nurse? -- Yes. Your doctor or nurse can find out what might be causing your incontinence. They can also suggest ways to relieve the problem.  When you speak to your doctor or nurse, ask if any of the medicines you take could be causing your symptoms. Some medicines can cause incontinence or make it worse.  Some people choose to wear pads or special underwear. These can help if you accidentally leak urine once in a while. But they can also cause skin irritation if you use them a lot. If you have incontinence, ask your doctor or nurse how to treat it.  How is incontinence treated? -- The treatment options differ depending on what type of incontinence you have. Some of the options include:   Medicines to relax the bladder   Surgery to repair the tissues that support the bladder or to improve the flow of urine   Electrical stimulation of the nerves that relax the bladder  Urinary incontinence is more common in people who have been through menopause. (Menopause is when you stop having monthly periods). Some people have vaginal dryness after menopause. If this is the case for you, a treatment called vaginal estrogen might help.  What will my life be like? -- Many people with incontinence can regain bladder control or at least reduce the amount of leakage they have. The most important thing is to tell your doctor or nurse. Then, work with them to find " an approach that helps you.  All topics are updated as new evidence becomes available and our peer review process is complete.  This topic retrieved from [employer] on: Feb 26, 2024.  Topic 43241 Version 18.0  Release: 32.2.4 - C32.56  © 2024 UpToDate, Inc. and/or its affiliates. All rights reserved.  figure 1: Location of the bladder     This drawing shows the side view of a woman's body. The bladder is in front of the vagina. The urethra is the tube that carries urine from the bladder out of the body.  Graphic 489299 Version 1.0  Consumer Information Use and Disclaimer   Disclaimer: This generalized information is a limited summary of diagnosis, treatment, and/or medication information. It is not meant to be comprehensive and should be used as a tool to help the user understand and/or assess potential diagnostic and treatment options. It does NOT include all information about conditions, treatments, medications, side effects, or risks that may apply to a specific patient. It is not intended to be medical advice or a substitute for the medical advice, diagnosis, or treatment of a health care provider based on the health care provider's examination and assessment of a patient's specific and unique circumstances. Patients must speak with a health care provider for complete information about their health, medical questions, and treatment options, including any risks or benefits regarding use of medications. This information does not endorse any treatments or medications as safe, effective, or approved for treating a specific patient. UpToDate, Inc. and its affiliates disclaim any warranty or liability relating to this information or the use thereof.The use of this information is governed by the Terms of Use, available at https://www.wolterskluwer.com/en/know/clinical-effectiveness-terms. 2024© UpToDate, Inc. and its affiliates and/or licensors. All rights reserved.  Copyright   © 2024 UpToDate, Inc. and/or its  affiliates. All rights reserved.

## 2025-01-30 NOTE — ASSESSMENT & PLAN NOTE
-A1c has increased to 7.6  -Plan to repeat in 4 months  -Continue Metformin 1000mg daily     Lab Results   Component Value Date    HGBA1C 7.6 (A) 01/30/2025     Orders:  •  POCT hemoglobin A1c  •  Albumin / creatinine urine ratio; Future

## 2025-04-01 ENCOUNTER — TELEPHONE (OUTPATIENT)
Dept: ADMINISTRATIVE | Facility: OTHER | Age: 80
End: 2025-04-01

## 2025-04-01 NOTE — TELEPHONE ENCOUNTER
Upon review of the In Basket request we have found/obtained the documentation. After careful review of the document we are unable to complete this request for Microalbumin Creatinine Urine Ratio OR Albumin Creatinine Urine Ratio  because the documentation does not have the result(s) needed to close the requested care gap(s). Specimen - Blood     Any additional questions or concerns should be emailed to the Practice Liaisons via the appropriate education email address, please do not reply via In Basket.    Thank you  Noni Wilson   [facility]

## 2025-04-01 NOTE — TELEPHONE ENCOUNTER
----- Message from Chandler Hannon MD sent at 3/31/2025  2:50 PM EDT -----  Regarding: Care Gap Request  03/31/25 2:50 PM    Hello, our patient attached above has had Urine Albumin/Creatinine Ratio completed/performed. Please assist in updating the patient chart by pulling the Care Everywhere (CE) document. The date of service is 3/28/25.     Thank you,  Chandler Hannon MD  [facility]

## 2025-04-08 ENCOUNTER — RESULTS FOLLOW-UP (OUTPATIENT)
Dept: INTERNAL MEDICINE CLINIC | Facility: CLINIC | Age: 80
End: 2025-04-08

## 2025-04-08 PROBLEM — R80.9 MICROALBUMINURIA DUE TO TYPE 2 DIABETES MELLITUS  (HCC): Status: ACTIVE | Noted: 2025-04-08

## 2025-04-08 PROBLEM — E11.29 MICROALBUMINURIA DUE TO TYPE 2 DIABETES MELLITUS  (HCC): Status: ACTIVE | Noted: 2025-04-08

## 2025-06-12 DIAGNOSIS — E11.9 TYPE II DIABETES MELLITUS, WELL CONTROLLED (HCC): ICD-10-CM

## 2025-06-12 RX ORDER — METFORMIN HYDROCHLORIDE 500 MG/1
500 TABLET, EXTENDED RELEASE ORAL 2 TIMES DAILY WITH MEALS
Qty: 180 TABLET | Refills: 1 | Status: SHIPPED | OUTPATIENT
Start: 2025-06-12

## 2025-06-19 ENCOUNTER — OFFICE VISIT (OUTPATIENT)
Dept: INTERNAL MEDICINE CLINIC | Facility: CLINIC | Age: 80
End: 2025-06-19
Payer: MEDICARE

## 2025-06-19 VITALS
WEIGHT: 158 LBS | DIASTOLIC BLOOD PRESSURE: 84 MMHG | HEART RATE: 65 BPM | OXYGEN SATURATION: 98 % | SYSTOLIC BLOOD PRESSURE: 140 MMHG | BODY MASS INDEX: 29.35 KG/M2

## 2025-06-19 DIAGNOSIS — N18.31 TYPE 2 DIABETES MELLITUS WITH STAGE 3A CHRONIC KIDNEY DISEASE, WITHOUT LONG-TERM CURRENT USE OF INSULIN (HCC): ICD-10-CM

## 2025-06-19 DIAGNOSIS — I10 ESSENTIAL HYPERTENSION: ICD-10-CM

## 2025-06-19 DIAGNOSIS — D33.3 VESTIBULAR SCHWANNOMA (HCC): ICD-10-CM

## 2025-06-19 DIAGNOSIS — E11.22 TYPE 2 DIABETES MELLITUS WITH STAGE 3A CHRONIC KIDNEY DISEASE, WITHOUT LONG-TERM CURRENT USE OF INSULIN (HCC): ICD-10-CM

## 2025-06-19 DIAGNOSIS — E04.2 MULTIPLE THYROID NODULES: ICD-10-CM

## 2025-06-19 DIAGNOSIS — I48.0 PAF (PAROXYSMAL ATRIAL FIBRILLATION) (HCC): Primary | ICD-10-CM

## 2025-06-19 PROBLEM — N28.1 BILATERAL RENAL CYSTS: Status: ACTIVE | Noted: 2025-06-19

## 2025-06-19 LAB
CREAT UR-MCNC: 59.8 MG/DL
MICROALBUMIN UR-MCNC: 36.3 MG/L
MICROALBUMIN/CREAT 24H UR: 61 MG/G CREATININE (ref 0–30)
SL AMB POCT HEMOGLOBIN AIC: 8.2 (ref ?–6.5)

## 2025-06-19 PROCEDURE — 83036 HEMOGLOBIN GLYCOSYLATED A1C: CPT | Performed by: INTERNAL MEDICINE

## 2025-06-19 PROCEDURE — G2211 COMPLEX E/M VISIT ADD ON: HCPCS | Performed by: INTERNAL MEDICINE

## 2025-06-19 PROCEDURE — 82570 ASSAY OF URINE CREATININE: CPT | Performed by: INTERNAL MEDICINE

## 2025-06-19 PROCEDURE — 99214 OFFICE O/P EST MOD 30 MIN: CPT | Performed by: INTERNAL MEDICINE

## 2025-06-19 PROCEDURE — 82043 UR ALBUMIN QUANTITATIVE: CPT | Performed by: INTERNAL MEDICINE

## 2025-06-19 RX ORDER — AMLODIPINE BESYLATE 5 MG/1
TABLET ORAL
COMMUNITY
Start: 2025-04-29

## 2025-06-19 RX ORDER — TIRZEPATIDE 2.5 MG/.5ML
2.5 INJECTION, SOLUTION SUBCUTANEOUS WEEKLY
Qty: 6 ML | Refills: 0 | Status: SHIPPED | OUTPATIENT
Start: 2025-06-19

## 2025-06-19 NOTE — PROGRESS NOTES
Name: Zenaida Perry      : 1945      MRN: [id number]  Encounter Provider: Chandler Hannon MD  Encounter Date: 2025   Encounter department: [facility]  :  Assessment & Plan  Type 2 diabetes mellitus with stage 3a chronic kidney disease, without long-term current use of insulin (HCC)  -Hemoglobin A1c above goal.    -Patient on a reduced dose of metformin 1000 mg daily due to reported GI symptoms.  -Patient in agreement with starting Mounjaro for better glycemic control.  Lab Results   Component Value Date    HGBA1C 8.2 (A) 2025       Orders:  •  POCT hemoglobin A1c  •  Tirzepatide (Mounjaro) 2.5 MG/0.5ML SOAJ; Inject 2.5 mg under the skin once a week  •  Albumin / creatinine urine ratio    PAF (paroxysmal atrial fibrillation) (HCC)  -Continue Eliquis BID for primary stroke prevention.       Essential hypertension  -Blood pressure well controlled.  Recently started on amlodipine by her cardiologist.  -Continue current antihypertensive regimen         Multiple thyroid nodules  -A thyroid US has been ordered for f/u of her nodules.   Orders:  •  US thyroid; Future    Vestibular schwannoma (HCC)  -Followed by neurosurgery at [facility].  [facility] due to her pacemaker.  Recommended she notify her physician at [place] for a new order and to discuss or if she can have the MRI performed.              History of Present Illness   HPI  Patient presents today for chronic follow-up.  Since her last visit she underwent a TAVR for treatment of severe aortic stenosis.  She was last seen by cardiology in May at which time she endorsed dizziness and was found to have poorly controlled blood pressure.  She states her previous cardiologist Dr. Pro will be retiring at the end of the month.  She subsequently transition to seeing Dr. Arora.  She reports upon evaluation of her blood pressure he started her on 5 mg of amlodipine  daily and she has been doing well on this ever since.    Review of Systems  All other systems negative except for pertinent findings noted in HPI.       Objective   /84 (BP Location: Right arm, Patient Position: Sitting, Cuff Size: Standard)   Pulse 65   Wt 71.7 kg (158 lb)   SpO2 98%   BMI 29.35 kg/m²      Physical Exam  General: NAD  HEENT: NCAT, EOMI, normal conjunctiva  Cardiovascular: RRR, normal S1 and S2, ELIU  Pulmonary: Normal respiratory effort, no wheezes, rales or rhonchi  GI: Soft, nontender, nondistended, normoactive bowel sounds  Musculoskeletal: Normal bulk and tone  Extremities: No lower extremity edema  Skin: Normal skin color, no rashes   Psychiatric: Normal mood and affect

## 2025-06-19 NOTE — ASSESSMENT & PLAN NOTE
-Blood pressure well controlled.  Recently started on amlodipine by her cardiologist.  -Continue current antihypertensive regimen

## 2025-06-19 NOTE — ASSESSMENT & PLAN NOTE
-Hemoglobin A1c above goal.    -Patient on a reduced dose of metformin 1000 mg daily due to reported GI symptoms.  -Patient in agreement with starting Mounjaro for better glycemic control.  Lab Results   Component Value Date    HGBA1C 8.2 (A) 06/19/2025       Orders:  •  POCT hemoglobin A1c  •  Tirzepatide (Mounjaro) 2.5 MG/0.5ML SOAJ; Inject 2.5 mg under the skin once a week  •  Albumin / creatinine urine ratio

## 2025-06-20 NOTE — ASSESSMENT & PLAN NOTE
-Followed by neurosurgery at [facility].  Patient reports [facility] informed her they will not perform her MRI due to her pacemaker.  Recommended she notify her physician at [place] for a new order and to discuss or if she can have the MRI performed.

## 2025-06-24 ENCOUNTER — TELEPHONE (OUTPATIENT)
Age: 80
End: 2025-06-24

## 2025-06-24 NOTE — TELEPHONE ENCOUNTER
PA for Tirzepatide (Mounjaro) 2.5 MG/0.5ML SOAJ APPROVED     Date(s) approved 6/24/2025 - 12/31/2025    Case # [id number]     Patient advised by          []RETChart Message  []Phone call   [x]LMOM  []L/M to call office as no active Communication consent on file  []Unable to leave detailed message as VM not approved on Communication consent       Pharmacy advised by    [x]Fax  []Phone call  []Secure Chat    Specialty Pharmacy    []      Approval letter scanned into [employer] Yes

## 2025-06-24 NOTE — TELEPHONE ENCOUNTER
PA for     Tirzepatide (Mounjaro) 2.5 MG/0.5ML SOAJ   SUBMITTED to [facility]    via    []CMM-KEY:    [x]Surescripts-Case ID #  [id number]     []Availity-Auth ID #  NDC #    []Faxed to plan   []Other website    []Phone call Case ID #      [x]PA sent as URGENT    All office notes, labs and other pertaining documents and studies sent. Clinical questions answered. Awaiting determination from insurance company.     Turnaround time for your insurance to make a decision on your Prior Authorization can take 7-21 business days.

## 2025-06-24 NOTE — TELEPHONE ENCOUNTER
Patient got notified by [facility] that they require more information before dispensing/. Did not state it required a prior authorization but its possible    Tirzepatide (Mounjaro) 2.5 MG/0.5ML

## 2025-06-25 NOTE — TELEPHONE ENCOUNTER
Yes, this will be the patient's maintenance dose.  Please call [facility] with the number provided in the previous message.

## 2025-06-25 NOTE — TELEPHONE ENCOUNTER
Pt called to say she spoke with [facility] again today and was told they need further info from us. I called [facility] at [phone] and spoke with Kimmy. The question they have is that 6 mL  was prescribed which is a 84 day supply. Is this going to be the pts typical maintenance dose? If yes, please call [facility] to advise using reference # [id number]. If not, please send a new script for a one month supply. Pt is aware this issue is pending.

## 2025-06-26 NOTE — TELEPHONE ENCOUNTER
Called [facility], they are aware. Advised that they will be putting in a script for 2.5ML for only 4 weeks then going up  to 5ML after.   No

## 2025-07-01 ENCOUNTER — PATIENT MESSAGE (OUTPATIENT)
Dept: INTERNAL MEDICINE CLINIC | Facility: CLINIC | Age: 80
End: 2025-07-01

## 2025-07-01 DIAGNOSIS — E11.22 TYPE 2 DIABETES MELLITUS WITH STAGE 3A CHRONIC KIDNEY DISEASE, WITHOUT LONG-TERM CURRENT USE OF INSULIN (HCC): Primary | ICD-10-CM

## 2025-07-01 DIAGNOSIS — N18.31 TYPE 2 DIABETES MELLITUS WITH STAGE 3A CHRONIC KIDNEY DISEASE, WITHOUT LONG-TERM CURRENT USE OF INSULIN (HCC): Primary | ICD-10-CM

## 2025-07-02 ENCOUNTER — HOSPITAL ENCOUNTER (OUTPATIENT)
Dept: ULTRASOUND IMAGING | Facility: MEDICAL CENTER | Age: 80
Discharge: HOME | End: 2025-07-02
Payer: MEDICARE

## 2025-07-02 DIAGNOSIS — E04.2 MULTIPLE THYROID NODULES: ICD-10-CM

## 2025-07-02 PROCEDURE — 76536 US EXAM OF HEAD AND NECK: CPT

## 2025-07-10 RX ORDER — TIRZEPATIDE 5 MG/.5ML
5 INJECTION, SOLUTION SUBCUTANEOUS WEEKLY
Qty: 6 ML | Refills: 0 | Status: SHIPPED | OUTPATIENT
Start: 2025-07-10